# Patient Record
Sex: FEMALE | Race: WHITE | HISPANIC OR LATINO | Employment: UNEMPLOYED | URBAN - METROPOLITAN AREA
[De-identification: names, ages, dates, MRNs, and addresses within clinical notes are randomized per-mention and may not be internally consistent; named-entity substitution may affect disease eponyms.]

---
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
## 2025-01-02 NOTE — PROGRESS NOTES
"    [facility]    Dialectical Behavior Therapy Program    DBT Individual Psychotherapy Progress Note    Date: Dec 20, 2024    Patient Name: Lauren Montenegro     Preferred Name: \"Jacqueline\"    Patient Pronouns: She/Her, They/Them    Patient MRN: [id number]    Provider: Daniel Landauer, PhD, LP    Procedure: Individual DBT session    Appointment Duration: 3:00 to 3:50 PM (50 minutes)    People Present: Jacqueline and Dr. Landauer    Diagnosis:   Encounter Diagnoses   Name Primary?    Schizoaffective disorder, depressive type (H) Yes    ADHD (attention deficit hyperactivity disorder), combined type     Generalized anxiety disorder     Tobacco use disorder     Cannabis use disorder      Treatment Plan                                                                                              Problem 1: Jacqueline has significant difficulty regulating strong emotions and impulses effectively. This difficulty has contributed to history of suicidal ideation, multiple suicide attempts, self-harm behavior, risk-taking behaviors (including substance use), angry outbursts, and interpersonal relationship challenges.     Goal Jacqueline will demonstrate at least a 75% decrease in frequency and intensity of SIB and SI and will learn and utilize effectively alternative emotion regulation, distress tolerance, and impulse control strategies 75% of the time when needed prior to discharge..   Intervention Jacqueline will participate in full-model DBT including individual and family therapy and skills group in order to develop appropriate and effective emotion regulation, distress tolerance and impulse control skills.   Progress: Some progress. Jacqueline has demonstrated some overall improvement in emotion regulation and distress tolerance skills. There has been a significant decrease in frequency and intensity of suicidal ideation. Though she continues to experience some urges to self-harm, it has been several " "months since last engagement in SIB.  She still struggles with impulse control when emotions are intense, especially related to substance use and making risky/unsafe choices.  Target Date: 2/1/2024     Problem 2: Jacqueline reports experiencing worsening symptoms of depression and anxiety. These symptoms have contributed to and are exacerbated by interpersonal problems, academic problems, low self-esteem, and use of ineffective coping skills.     Goal \"I want to be happy, not looking at all negatives.\"     Jacqueline will demonstrate an increase in behavioral activation and effective coping by engaging in family and/or peer activities at least 2x per week, engaging in at least one pleasant or success activity per day, and use of coping skills in stressful situations at least 75% of the time..   Intervention Jacqueline will participate in full-model DBT including individual and family therapy and skills group in order to increase behavioral activation, develop stress management skills, improve mood, improve self-esteem, and manage anxiety.   Progress: Some progress. Jacqueline' mood has fluctuated since starting therapy. More recently she has indicated feeling more depressed and stressed and having increases in negative self-talk and decreases in self-esteem. She often worries that she is a burden on her family because of her mental health and substance use issues and feeling like she is always messing things up. She can experience dawood and has demonstrated overall improvement in motivation. She continues to struggle with sticking to plans to  improve healthy habits.  Target Date: 2/1/2024     Problem 3: Jacqueline has an extensive history of substance use and abuse, which has contributed to significant  interpersonal stress, academic issues, and depression and anxiety symptoms. She has had difficulty with maintaining sobriety despite these consequences. She has indicated little motivation to stay sober from Marijuana     Goal \"I want skills " "not to relapse.\"  Jacqueline will refrain from alcohol and drug use. She will learn and utilize alternative coping skills to substance use and will develop efficacy with refusing substances in peer situations, such that she is able to maintain 100% sobriety from alcohol, marijuana, and other drugs by the end of DBT programming   Intervention Jacqueline will participate in full-model DBT including individual and family therapy and skills group in order to develop effective distress tolerance and impulse control skills, so that she can resist urges to use substances, increase ability to make Wise Mind decisions about substance use, and increase awareness of factors that contribute to urges to use substances.   Progress: Intermittent progress. Jacqueline's dedication sobriety has waned over the last several months and she no longer wants to stay sober from marijuana. She continues to deny desire to use any other substances and states that she plans to stay clean from other drugs. She believes that she is able to regulate her marijuana use and not be dependent on it, though it is not clear if she is actually capable of only using marijuana recreationally. She is currently pursuing medical marijuana; she believes that this will be a safer way for her to use.  Target Date: 2/1/2024        Treatment Plan Completed: 10/06/2023  Treatment Plan Updated/Reviewed: 11/22/204  Treatment Plan Review Due: 2/01/2024  Session Content                                                                                               Subjective:  Jacqueline reported that her mood has been okay over the last week, though she expressed feeling stressed about school and her relationship with her boyfriend. She continued to report worry that her boyfriend is distancing himself from her and is not as interested in her as he used to be. She acknowledged that her anxiety has led her to be more avoidant as well; she expressed ambivalence about going to see him later " today. She also expressed anxiety about transitioning from this Clinician.      Objective/Intervention:   Clinician utilized a DBT approach during the session. Clinician and [place] reviewed the events since the last session. Clinician and [place] assessed for safety issues. Clinician and Jacqueline continued to address and process her concerns about her relationship with her boyfriend. Clinician and Jacqueline explored the effect her anxiety and negative self-talk is having on her engagement in the relationship and discussed the strategies she can use to counter her anxious thoughts and urges. Clinician and Jacqueline further processed her thoughts and feelings of transitioning from this Clinician to her other therapist.    Assessment:   Jacqueline initially presented as alert, engaged, and somewhat anxious today. She participated actively during the session. We discussed easing the transition to the other therapist by systematically reducing frequency of our sessions until she is settled with the new therapist.      Primary Targets Addressed in This Session:  Life-Threatening Behavior: N/A  Therapy Interfering Behavior: Substance use, sleepiness  Quality of Life: Interpersonal relationships, family issues, Healthy habits, self-esteem    DBT Skills reviewed or taught in Session:    ABC PLEASE skills, ACCEPTS/Self-soothe, Opposite Action, Pros and Cons, checking the facts, STOP, DEAR MAN    Patient Used Phone Coaching Since Last Session: No     Chain Analysis/Solution Analysis? No    Behavioral Assignments:  1) Utilize learned DBT Skills  2) Engage in daily exercise, eat 3 meals a day, practice good sleep hygiene.     Plan: Patient will continue in individual DBT until she is able to transition to another therapist for longer-term care.    Mental Status                                                                                                Behavioral Observations/Mental Status: Patient arrived on time to session. Patient was  adequately groomed. Eye contact was adequate. Mood today was anxious. Observed affect was full range. Speech was regular rate and rhythm. Thought process was Logical and Linear. Patient was actively engaged in session.  Risk Assessment:     Jacqueline has a long history dating back to early adolescence of suicidal ideation, self-harm behavior, and multiple suicide attempts. Most recent suicide attempt occurred in June 2023 while she was in residential substance use treatment. This incident was triggered by a combination of being bullied by other residents and frustration with not being able to go home. Previous suicide attempts have been of an impulsive nature and are usually triggered by a specific event. She has tried hanging herself 3 times and drowning herself 1 time. She last engaged in self-injury about a month prior to this assessment. At the time of the assessment she is denying experiencing active suicidal thoughts and denying significant urges to self-injure. She denied  experiencing passive suicidal ideation in recent weeks and denied any in the last week.     At the time of the assessment, Jacqueline identifies several reasons for living and demonstrates future-oriented thinking. She indicated that she is confident that she will not attempt to kill herself again, especially if she is able to stay in outpatient care. She identifies family and one friend as sources of social support who she can reach out to if she needs help or a distraction. She is able to identify other distractions and coping skills she can use if ideation gets more intense.     Based on risk and protective factors, patient is assessed to be at a Low Acute Risk (i.e., no current suicidal intent, specific plan, preparatory behaviors, and high confidence in patient's ability to maintain safety). She is likely at intermediate chronic risk for suicidal behavior given her challenges with emotion regulation and impulse control.    Daniel Landauer, PhD,  LP

## 2025-01-02 NOTE — PROGRESS NOTES
"    [facility]    Dialectical Behavior Therapy Program    DBT Individual Psychotherapy Progress Note    Date: Dec 13, 2024    Patient Name: Lauren Montenegro     Preferred Name: \"Jacqueline\"    Patient Pronouns: She/Her, They/Them    Patient MRN: [id number]    Provider: Daniel Landauer, PhD, LP    Procedure: Individual DBT session    Appointment Duration: 3:10 to 4:00 PM (50 minutes)    People Present: Jacqueline and Dr. Landauer    Diagnosis:   Encounter Diagnoses   Name Primary?    Schizoaffective disorder, depressive type (H) Yes    ADHD (attention deficit hyperactivity disorder), combined type     Generalized anxiety disorder     Tobacco use disorder     Cannabis use disorder            Treatment Plan                                                                                              Problem 1: Jacqueline has significant difficulty regulating strong emotions and impulses effectively. This difficulty has contributed to history of suicidal ideation, multiple suicide attempts, self-harm behavior, risk-taking behaviors (including substance use), angry outbursts, and interpersonal relationship challenges.     Goal Jacqueline will demonstrate at least a 75% decrease in frequency and intensity of SIB and SI and will learn and utilize effectively alternative emotion regulation, distress tolerance, and impulse control strategies 75% of the time when needed prior to discharge..   Intervention Jacqueline will participate in full-model DBT including individual and family therapy and skills group in order to develop appropriate and effective emotion regulation, distress tolerance and impulse control skills.   Progress: Some progress. Jacqueline has demonstrated some overall improvement in emotion regulation and distress tolerance skills. There has been a significant decrease in frequency and intensity of suicidal ideation. Though she continues to experience some urges to self-harm, it has been " "several months since last engagement in SIB.  She still struggles with impulse control when emotions are intense, especially related to substance use and making risky/unsafe choices.  Target Date: 2/1/2024     Problem 2: Jacqueline reports experiencing worsening symptoms of depression and anxiety. These symptoms have contributed to and are exacerbated by interpersonal problems, academic problems, low self-esteem, and use of ineffective coping skills.     Goal \"I want to be happy, not looking at all negatives.\"     Jacqueline will demonstrate an increase in behavioral activation and effective coping by engaging in family and/or peer activities at least 2x per week, engaging in at least one pleasant or success activity per day, and use of coping skills in stressful situations at least 75% of the time..   Intervention Jacqueline will participate in full-model DBT including individual and family therapy and skills group in order to increase behavioral activation, develop stress management skills, improve mood, improve self-esteem, and manage anxiety.   Progress: Some progress. Jacqueline' mood has fluctuated since starting therapy. More recently she has indicated feeling more depressed and stressed and having increases in negative self-talk and decreases in self-esteem. She often worries that she is a burden on her family because of her mental health and substance use issues and feeling like she is always messing things up. She can experience dawood and has demonstrated overall improvement in motivation. She continues to struggle with sticking to plans to  improve healthy habits.  Target Date: 2/1/2024     Problem 3: Jacqueline has an extensive history of substance use and abuse, which has contributed to significant  interpersonal stress, academic issues, and depression and anxiety symptoms. She has had difficulty with maintaining sobriety despite these consequences. She has indicated little motivation to stay sober from Marijuana     Goal \"I want " "skills not to relapse.\"  Jacqueline will refrain from alcohol and drug use. She will learn and utilize alternative coping skills to substance use and will develop efficacy with refusing substances in peer situations, such that she is able to maintain 100% sobriety from alcohol, marijuana, and other drugs by the end of DBT programming   Intervention Jacqueline will participate in full-model DBT including individual and family therapy and skills group in order to develop effective distress tolerance and impulse control skills, so that she can resist urges to use substances, increase ability to make Wise Mind decisions about substance use, and increase awareness of factors that contribute to urges to use substances.   Progress: Intermittent progress. Jacqueline's dedication sobriety has waned over the last several months and she no longer wants to stay sober from marijuana. She continues to deny desire to use any other substances and states that she plans to stay clean from other drugs. She believes that she is able to regulate her marijuana use and not be dependent on it, though it is not clear if she is actually capable of only using marijuana recreationally. She is currently pursuing medical marijuana; she believes that this will be a safer way for her to use.  Target Date: 2/1/2024        Treatment Plan Completed: 10/06/2023  Treatment Plan Updated/Reviewed: 11/22/204  Treatment Plan Review Due: 2/01/2024  Session Content                                                                                               Subjective:  Jacqueline reported that her mood has been up and down over the past weeks. She endorsed still ruminating a lot about the status of her relationship with her boyfriend and it is still significantly affecting her overall mood. She reported that after talking with her family it was decided that she would transition to the new therapist because the therapist's office is much closer to home and it will be easier to " "get to the appointments. She noted, however, that she thinks it will be difficult to transition to the new therapist from this Clinician even though she does like the new therapist.     Objective/Intervention:   Clinician utilized a DBT approach during the session. Clinician and Jacqueline reviewed the events since the last session. Clinician and Jacqueline assessed for safety issues. Clinician and Jacqueline continued to address and process her concerns about her relationship with her boyfriend. Clinician and [place] reviewed successes and challenges she has had with \"checking the facts\" with regard to her relationship concerns. Clinician and [place] then further explored her ongoing challenges with self-esteem and self-confidence. Clinician and [place] then discussed the decision to transition to her new therapist and reviewed options for making that transition successful.    Assessment:   Jacqueline initially presented as very sleepy - she was sleeping in the waiting room and it was difficult to rouse her. It took awhile for her to be more alert during the session (she admitted that she had stayed up very late playing video games the night before). She endorsed challenges with checking the facts as her emotions tends to be overpowering. She will meet with this Clinician next week to further discuss the transition plan to her new therapist.       Primary Targets Addressed in This Session:  Life-Threatening Behavior: N/A  Therapy Interfering Behavior: Substance use, sleepiness  Quality of Life: Interpersonal relationships, family issues, Healthy habits, self-esteem    DBT Skills reviewed or taught in Session:    ABC PLEASE skills, ACCEPTS/Self-soothe, Opposite Action, Pros and Cons, checking the facts, STOP, DEAR MAN    Patient Used Phone Coaching Since Last Session: No     Chain Analysis/Solution Analysis? No    Behavioral Assignments:  1) Utilize learned DBT Skills  2) Engage in daily exercise, eat 3 meals a day, practice good sleep " hygiene.     Plan: Patient will continue in individual DBT until she is able to transition to another therapist for longer-term care.    Mental Status                                                                                                Behavioral Observations/Mental Status: Patient arrived on time to session. Patient was adequately groomed. Eye contact was adequate. Mood today was anxious. Observed affect was full range. Speech was regular rate and rhythm. Thought process was Logical and Linear. Patient was actively engaged in session.  Risk Assessment:     Jacqueline has a long history dating back to early adolescence of suicidal ideation, self-harm behavior, and multiple suicide attempts. Most recent suicide attempt occurred in June 2023 while she was in residential substance use treatment. This incident was triggered by a combination of being bullied by other residents and frustration with not being able to go home. Previous suicide attempts have been of an impulsive nature and are usually triggered by a specific event. She has tried hanging herself 3 times and drowning herself 1 time. She last engaged in self-injury about a month prior to this assessment. At the time of the assessment she is denying experiencing active suicidal thoughts and denying significant urges to self-injure. She denied  experiencing passive suicidal ideation in recent weeks and denied any in the last week.     At the time of the assessment, Jacqueline identifies several reasons for living and demonstrates future-oriented thinking. She indicated that she is confident that she will not attempt to kill herself again, especially if she is able to stay in outpatient care. She identifies family and one friend as sources of social support who she can reach out to if she needs help or a distraction. She is able to identify other distractions and coping skills she can use if ideation gets more intense.     Based on risk and protective factors,  patient is assessed to be at a Low Acute Risk (i.e., no current suicidal intent, specific plan, preparatory behaviors, and high confidence in patient's ability to maintain safety). She is likely at intermediate chronic risk for suicidal behavior given her challenges with emotion regulation and impulse control.    Daniel Landauer, PhD, LP

## 2025-01-06 ENCOUNTER — TELEPHONE (OUTPATIENT)
Dept: OBGYN | Facility: CLINIC | Age: 20
End: 2025-01-06
Payer: COMMERCIAL

## 2025-01-06 NOTE — TELEPHONE ENCOUNTER
[facility] Call Center    Phone Message    May a detailed message be left on voicemail: yes     Reason for Call: Symptoms or Concerns     If patient has red-flag symptoms, warm transfer to triage line    Current symptom or concern: Pt reporting vaginal bleeding from nexplanon.     Symptoms have been present for:  1 day(s)    Has patient previously been seen for this? No      Are there any new or worsening symptoms? No    Action Taken: Message routed to:  Other: [facility]    Travel Screening: Not Applicable

## 2025-01-09 ENCOUNTER — OFFICE VISIT (OUTPATIENT)
Dept: PSYCHIATRY | Facility: CLINIC | Age: 20
End: 2025-01-09
Attending: STUDENT IN AN ORGANIZED HEALTH CARE EDUCATION/TRAINING PROGRAM
Payer: COMMERCIAL

## 2025-01-09 VITALS
HEART RATE: 111 BPM | DIASTOLIC BLOOD PRESSURE: 83 MMHG | WEIGHT: 168.4 LBS | SYSTOLIC BLOOD PRESSURE: 118 MMHG | BODY MASS INDEX: 31.82 KG/M2

## 2025-01-09 DIAGNOSIS — F90.2 ADHD (ATTENTION DEFICIT HYPERACTIVITY DISORDER), COMBINED TYPE: ICD-10-CM

## 2025-01-09 DIAGNOSIS — T88.7XXA MEDICATION SIDE EFFECTS: ICD-10-CM

## 2025-01-09 DIAGNOSIS — F12.90 CANNABIS USE DISORDER: ICD-10-CM

## 2025-01-09 DIAGNOSIS — F25.1 SCHIZOAFFECTIVE DISORDER, DEPRESSIVE TYPE (H): ICD-10-CM

## 2025-01-09 RX ORDER — ARIPIPRAZOLE 10 MG/1
10 TABLET ORAL AT BEDTIME
Qty: 30 TABLET | Refills: 2 | Status: SHIPPED | OUTPATIENT
Start: 2025-01-09

## 2025-01-09 RX ORDER — ATOMOXETINE 40 MG/1
40 CAPSULE ORAL DAILY
Qty: 30 CAPSULE | Refills: 2 | Status: SHIPPED | OUTPATIENT
Start: 2025-01-09

## 2025-01-09 RX ORDER — METFORMIN HYDROCHLORIDE 500 MG/1
TABLET, EXTENDED RELEASE ORAL
Qty: 90 TABLET | Refills: 2 | Status: SHIPPED | OUTPATIENT
Start: 2025-01-09

## 2025-01-09 RX ORDER — ESCITALOPRAM OXALATE 20 MG/1
20 TABLET ORAL DAILY
Qty: 30 TABLET | Refills: 2 | Status: SHIPPED | OUTPATIENT
Start: 2025-01-09

## 2025-01-09 ASSESSMENT — ANXIETY QUESTIONNAIRES
7. FEELING AFRAID AS IF SOMETHING AWFUL MIGHT HAPPEN: MORE THAN HALF THE DAYS
8. IF YOU CHECKED OFF ANY PROBLEMS, HOW DIFFICULT HAVE THESE MADE IT FOR YOU TO DO YOUR WORK, TAKE CARE OF THINGS AT HOME, OR GET ALONG WITH OTHER PEOPLE?: VERY DIFFICULT
GAD7 TOTAL SCORE: 13
IF YOU CHECKED OFF ANY PROBLEMS ON THIS QUESTIONNAIRE, HOW DIFFICULT HAVE THESE PROBLEMS MADE IT FOR YOU TO DO YOUR WORK, TAKE CARE OF THINGS AT HOME, OR GET ALONG WITH OTHER PEOPLE: VERY DIFFICULT
6. BECOMING EASILY ANNOYED OR IRRITABLE: SEVERAL DAYS
4. TROUBLE RELAXING: SEVERAL DAYS
2. NOT BEING ABLE TO STOP OR CONTROL WORRYING: MORE THAN HALF THE DAYS
3. WORRYING TOO MUCH ABOUT DIFFERENT THINGS: MORE THAN HALF THE DAYS
GAD7 TOTAL SCORE: 13
5. BEING SO RESTLESS THAT IT IS HARD TO SIT STILL: NEARLY EVERY DAY
1. FEELING NERVOUS, ANXIOUS, OR ON EDGE: MORE THAN HALF THE DAYS

## 2025-01-09 ASSESSMENT — PAIN SCALES - GENERAL: PAINLEVEL_OUTOF10: NO PAIN (0)

## 2025-01-09 NOTE — PATIENT INSTRUCTIONS
Treatment plan:    Medications- only change is to start taking your melatonin earlier at 7pm   - Continue atomoxetine 40 mg PO qDay  - Continue escitalopram 20 mg po qDay  - Continue aripiprazole 10 mg PO daily at bedtime   - Continue metformin  mg qAM (~7:30 am) and 1000 mg (2 tabs) with dinner (8 pm) for a total daily dose of 1500 mg)  -continue NAC supplement up to 1200mg BID    Continue individual therapy    Start using light box (10,000 lux) for 30 minutes to an hour when you wake up     **For crisis resources, please see the information at the end of this document**   Patient Education    Thank you for coming to the [facility] [place] [facility].     Lab Testing:  If you had lab testing today and your results are reassuring or normal they will be mailed to you or sent through [place] within 7 days. If the lab tests need quick action we will call you with the results. The phone number we will call with results is # [phone]. If this is not the best number please call our clinic and change the number.     Medication Refills:  If you need any refills please call your pharmacy and they will contact us. Our fax number for refills is [phone].   Three business days of notice are needed for general medication refill requests.   Five business days of notice are needed for controlled substance refill requests.   If you need to change to a different pharmacy, please contact the new pharmacy directly. The new pharmacy will help you get your medications transferred.     Contact Us:  Please call [phone] during business hours (8-5:00 M-F).   If you have medication related questions after clinic hours, or on the weekend, please call [phone].     Financial Assistance [phone]   Medical Records [phone]       MENTAL HEALTH CRISIS RESOURCES:  For a emergency help, please call 911 or go to the nearest Emergency Department.     Emergency Walk-In Options:   [facility] @ [facility] ([place]): [phone] - Specialized mental health emergency area designed to be calming  [facility] ([place]): [phone]  [facility] ([place]): [phone]  [facility] ([place]): [phone]    County Crisis Information:   [place]: [phone]  [place]: [phone]  [place] ([place]) - Adult: [phone]     Child: [phone]  [place] - Adult: [phone]     Child: [phone]  [place]: [phone]  List of all [facility] resources:   https://[place].[email or web address]/dhs/people-we-serve/adults/health-care/mental-health/resources/crisis-contacts.jsp    National Crisis Information:   Crisis Text Line: Text  [place]  to 019623  Suicide & Crisis Lifeline: 988  National Suicide Prevention Lifeline: 9-469-942-QPRX (1-792.163.5500)       For online chat options, visit https://suicidepreventionlifeline.org/chat/  Poison Control Center: 1-967.484.6189  Trans Lifeline: 1-148.614.2067 - Hotline for transgender people of all ages  [employer]: 1-077-703-0841 - Hotline for LGBT youth     For Non-Emergency Support:   Fast Tracker: Mental Health & Substance Use Disorder Resources -   https://www.[employer].org/

## 2025-01-09 NOTE — PROGRESS NOTES
" owns   [facility]  Psychosis Treatment Team  MEDICAL PROGRESS NOTE       CARE TEAM:    PCP: Caroline Moreno  Therapist: Prisca Stallings - [facility]  (Aly Bell)- Leela Phillips is a 19 year old who uses the pronouns she, her, hers, they, them, theirs.                 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    Today, Jacqueline reports somewhat improved adherence with medications and has recently had a PCA start who will help with this and other daily tasks.  Reports continued daily cannabis use, has no interest in abstaining at this time, also denies any symptoms of psychosis.  Reports intermittent thoughts of self-harm, though has not acted on these, continues DBT which she finds helpful and denies any other safety concerns, including SI.  During the visit, she was notably sleepy, discussed with dad and her who agreed that her sleep cycle is not optimized and therefore she is very tired in the afternoons, which her therapist has also noticed.  Encouraged use of sad lamp in the morning and taking melatonin several hours before intended bedtime.  Dad reports she did have a sleep " study a few years ago which did not show acute concerns or need for interventions, can consider future sleep study for sleep apnea as needed and if lifestyle/sleep hygiene interventions are not adequate.      Psychotropic Drug Interactions:    ADDITIVE SEROTONERGIC: Abilify, escitalopram  ADDITIVE QTc: Escitalopram, atomoxetine   Management: routine monitoring    [place]NPMP was not checked today: not using controlled substances    Risk Statements:   Treatment Risk: Risks, benefits, alternatives and potential adverse effects have been discussed and are understood.   Safety Risk: Jacqueline did not appear to be an imminent safety risk to self or others.  Future Considerations:  -Continue coordinating care with parents as needed   -Optimizing ADHD medication as needed (atomoxetine)  -Avoid stimulants due to potential for worsening psychosis   -Monitoring Abilify effectiveness as well as lont-term use/metabolic side effects.  -Consider referral to weight management clinic if needed   -Residential level of care was recommended by Dr. Alejo, we anticipate continuing working with Jacqueline in the interim until she is able to go to residential treatment.   -Once a period of sustained sobriety is achieved, consider obtaining neuropsychological testing to better understand her functioning as well as continue to support her with appropriate resources   -Continue offering/exploring resources for substance use/relapse prevention       PLAN    1) Medications: no changes to regimen  - Continue atomoxetine 40 mg PO qDay  - Continue escitalopram 20 mg po qDay  - Continue aripiprazole 10 mg PO daily at bedtime (had been taking in the AM)   - Continue metformin  mg qAM (~7:30 am) and 1000 mg (2 tabs) with dinner (8 pm) for a total daily dose of 1500 mg)  -continue NAC supplement up to 1200mg BID     Other:   -light box to promote wakefulness/support mood  -Continue melatonin 1-3 mg PO at bedtime PRN.      2) Psychotherapy: Individual therapy  with Prisca Stallings, individual DBT with Dr. Melara weekly    3) Next due:  Labs- Yearly AP labs-updated CBC, CMP, A1C and fasting lipids on 07/18/24  EKG- Routine monitoring is not indicated for current psychotropic medication regimen   Rating scales- AIMS:last score = 0. Next due December 2024. PHQ9, GAD7     4) Referrals: None    5) Follow-up: Return to clinic in 4 weeks                  Interval History   - Updates: went to cabin with family holiday, this was relaxing and she enjoyed it.  Continues to have some relationship stress with her boyfriend. PCA services set up recently- come out one hour each day to remind her to take medications    -Mood: had some low mood about relationship stuff, though this is temporary  -SI: denies   -Anxiety: not good, been nervous recently about relationship, no panic attacks  -Sleep: tired in afternoon, goes to bed at 11pm, wakes up at 6am, thinks in general she gets enough sleep   Dad thinks sleep hygiene is not ideal, hard to get her up in the morning.   -Appetite/eating: wants to eat less   -Psychosis: Denies any symptoms  -Substances: Regular cannabis use  -Therapy: Continues with individual therapy and DBT  -Medications: Thinks adherence has slightly improved, although still forgets sometimes on weekends    Current Social History:  Financial/occupational: in school   Living situation: lives at home with parents  Social/spiritual support: family, friends, medical team      Pertinent Substance Use:  [Last updated 09/05/24]  Alcohol: Yes: intermittent   Cannabis: Yes: daily   Tobacco: Yes: once every other day vaping     Medical Review of Systems / Med sfx:     Contraception: Yes: getting nexplanon                 Summary Points of Current Care  09/2024: mood continues to be stable, dicussed cannabis use with patient and parents and contraindication with hx of psychosis and mood   10/24: Due to daytime sedation, encouraged her to take shorter naps and regulate sleep cycle,  also encouraged to abstain from cannabis  11/24: No medication changes, did request refill of OCP which we will bridge until establishes with new PCP  12/24: No medication changes, discussed strategies for improved adherence, will be getting Nexplanon  1/2025: No medication changes, recommended using [facility] and melatonin a few hours before intended bedtime to help with sleep hygiene                Physical Exam  (Vitals Only)    LMP 11/08/2024 (Approximate)   Pulse Readings from Last 3 Encounters:   12/05/24 81   11/07/24 94   10/03/24 105     Wt Readings from Last 3 Encounters:   12/10/24 74.8 kg (165 lb) (90%, Z= 1.26)*   12/04/24 74.8 kg (165 lb) (90%, Z= 1.26)*   11/07/24 75.4 kg (166 lb 3.2 oz) (90%, Z= 1.30)*     * Growth percentiles are based on [facility] (Girls, 2-20 Years) data.     BP Readings from Last 3 Encounters:   12/10/24 110/70   12/05/24 110/74   12/04/24 102/64                      Mental Status Exam    Alertness: oriented, drowsy, and sleepy  Appearance: casually groomed  Behavior/Demeanor: cooperative and calm, with good  eye contact   Speech: normal and regular rate and rhythm  Language: intact and no problems  Psychomotor: normal or unremarkable  Mood: description consistent with euthymia  Affect: restricted; congruent to: mood- yes, content- yes  Thought Process/Associations: unremarkable  Thought Content:  Reports none;  Denies suicidal ideation  Perception:  Reports none;  Denies hallucinations  Insight: good  Judgment: good  Cognition: does  appear grossly intact; formal cognitive testing was not done  Gait and Station: unremarkable                  Past Psychotropic Medication Trials          Medication Max Dose (mg) Dates / Duration Helpful? DC Reason / Adverse Effects?   sertraline           escitalopram 20         buspar           bupropion                       prazosin           hydroxyzine       Not needed/taken   adderall           Atomoxetine  25                     Abilify  10                         Past Medical History     Patient Active Problem List   Diagnosis    Suicidal ideation    Schizoaffective disorder, bipolar type (H)    ADHD (attention deficit hyperactivity disorder), combined type    Anxiety    Cannabis use disorder    Suicide ideation                     Medications     Current Outpatient Medications   Medication Sig Dispense Refill    acetylcysteine (N-ACETYL CYSTEINE) 600 MG CAPS capsule Take 2 capsules (1,200 mg) by mouth 2 times daily. (Patient not taking: Reported on 12/10/2024) 120 capsule 2    ARIPiprazole (ABILIFY) 10 MG tablet Take 1 tablet (10 mg) by mouth at bedtime. 30 tablet 2    atomoxetine (STRATTERA) 40 MG capsule Take 1 capsule (40 mg) by mouth daily. 30 capsule 2    escitalopram (LEXAPRO) 20 MG tablet Take 1 tablet (20 mg) by mouth daily. 30 tablet 2    etonogestrel (NEXPLANON) 68 MG IMPL 1 each (68 mg) by Subdermal route once.      metFORMIN (GLUCOPHAGE XR) 500 MG 24 hr tablet Take 2 tablets (1,000 mg) by mouth daily AND 1 tablet (500 mg) daily (with dinner). For a total daily dose of 1500 mg. 90 tablet 2    naloxone (NARCAN) 4 MG/0.1ML nasal spray Spray 1 spray (4 mg) into one nostril alternating nostrils as needed for opioid reversal. every 2-3 minutes until assistance arrives 2 each 3    norgestimate-ethinyl estradiol (ORTHO-CYCLEN) 0.25-35 MG-MCG tablet Take 1 tablet by mouth daily. (Patient not taking: Reported on 12/4/2024) 30 tablet 1    triamcinolone (KENALOG) 0.025 % cream Apply topically daily as needed for irritation 80 g 0                     Data         7/18/2024     1:10 PM 8/30/2024     8:00 AM 9/17/2024     5:19 PM   PROMIS-10 Total Score w/o Sub Scores   PROMIS TOTAL - SUBSCORES 27 22    22 26         12/10/2024     3:10 PM 1/13/2025     2:27 PM 1/13/2025     2:28 PM   PHQ-9 SCORE   PHQ-9 Total Score MyChart   17 (Moderately severe depression)   PHQ-9 Total Score 13 17         Patient-reported         12/4/2024     3:22 PM 12/10/2024      3:10 PM 1/9/2025     1:44 PM   SHIVANI-7 SCORE   Total Score 13 (moderate anxiety)  13 (moderate anxiety)   Total Score 13  14 13        Patient-reported       Liver/Kidney Function, TSH Metabolic Blood counts   Recent Labs   Lab Test 07/18/24  1510 06/24/23  0717   AST 19 21   ALT 18 20   ALKPHOS 68 64   CR 0.75 0.57     Recent Labs   Lab Test 06/24/23  0717   TSH 1.44    Recent Labs   Lab Test 07/18/24  1510   CHOL 202*   TRIG 122*      HDL 71     Recent Labs   Lab Test 07/18/24  1510   A1C 5.0     Recent Labs   Lab Test 07/18/24  1510   GLC 81    Recent Labs   Lab Test 07/18/24  1510   WBC 9.1   HGB 12.5*   HCT 36.9   MCV 84              Level of Medical Decision Making:   - At least 1 chronic problem that is not stable  - Engaged in prescription drug management during visit (discussed any medication benefits, side effects, alternatives, etc.)       The longitudinal plan of care for the diagnosis(es)/condition(s) as documented were addressed during this visit. Due to the added complexity in care, I will continue to support Jacqueline in the subsequent management and with ongoing continuity of care.      PROVIDER: Rosa Govea MD    Patient not staffed in clinic.  Note will be reviewed and signed by Dr. Godoy

## 2025-01-09 NOTE — NURSING NOTE
Chief Complaint   Patient presents with    Recheck Medication     ADHD (attention deficit hyperactivity disorder), combined type      - José Miguel Khan, Visit Facilitator

## 2025-01-13 ENCOUNTER — VIRTUAL VISIT (OUTPATIENT)
Dept: PSYCHOLOGY | Facility: CLINIC | Age: 20
End: 2025-01-13
Payer: COMMERCIAL

## 2025-01-13 DIAGNOSIS — F90.2 ADHD (ATTENTION DEFICIT HYPERACTIVITY DISORDER), COMBINED TYPE: ICD-10-CM

## 2025-01-13 DIAGNOSIS — F41.1 GAD (GENERALIZED ANXIETY DISORDER): ICD-10-CM

## 2025-01-13 DIAGNOSIS — F25.1 SCHIZOAFFECTIVE DISORDER, DEPRESSIVE TYPE (H): Primary | ICD-10-CM

## 2025-01-13 DIAGNOSIS — F12.90 CANNABIS USE DISORDER: ICD-10-CM

## 2025-01-13 PROCEDURE — 90834 PSYTX W PT 45 MINUTES: CPT | Mod: 95

## 2025-01-13 ASSESSMENT — PATIENT HEALTH QUESTIONNAIRE - PHQ9
10. IF YOU CHECKED OFF ANY PROBLEMS, HOW DIFFICULT HAVE THESE PROBLEMS MADE IT FOR YOU TO DO YOUR WORK, TAKE CARE OF THINGS AT HOME, OR GET ALONG WITH OTHER PEOPLE: VERY DIFFICULT
SUM OF ALL RESPONSES TO PHQ QUESTIONS 1-9: 17
SUM OF ALL RESPONSES TO PHQ QUESTIONS 1-9: 17

## 2025-01-17 ENCOUNTER — OFFICE VISIT (OUTPATIENT)
Dept: PSYCHIATRY | Facility: CLINIC | Age: 20
End: 2025-01-17
Payer: COMMERCIAL

## 2025-01-17 DIAGNOSIS — F17.200 TOBACCO USE DISORDER: ICD-10-CM

## 2025-01-17 DIAGNOSIS — F12.90 CANNABIS USE DISORDER: ICD-10-CM

## 2025-01-17 DIAGNOSIS — F41.1 GENERALIZED ANXIETY DISORDER: ICD-10-CM

## 2025-01-17 DIAGNOSIS — F90.2 ADHD (ATTENTION DEFICIT HYPERACTIVITY DISORDER), COMBINED TYPE: ICD-10-CM

## 2025-01-17 DIAGNOSIS — F25.1 SCHIZOAFFECTIVE DISORDER, DEPRESSIVE TYPE (H): Primary | ICD-10-CM

## 2025-01-17 PROCEDURE — 90785 PSYTX COMPLEX INTERACTIVE: CPT | Performed by: PSYCHOLOGIST

## 2025-01-17 PROCEDURE — 90837 PSYTX W PT 60 MINUTES: CPT | Performed by: PSYCHOLOGIST

## 2025-01-17 NOTE — PROGRESS NOTES
[facility]                                     Progress Note    Patient Name: Lauren Montenegro  Date: 1/13/2025         Service Type: Individual      Session Start Time: 2:30 PM  Session End Time: 3:08 PM     Session Length: 38 Minutes    Session #: 5    Attendees: Client attended alone    Service Modality: Video Visit:      Provider verified identity through the following two step process.  Patient provided:  Patient is known previously to provider    Telemedicine Visit: The patient's condition can be safely assessed and treated via synchronous audio and visual telemedicine encounter.      Reason for Telemedicine Visit: Patient has requested telehealth visit    Originating Site (Patient Location): Patient's home    Distant Site (Provider Location): [facility]    Consent:  The patient/guardian has verbally consented to: the potential risks and benefits of telemedicine (video visit) versus in person care; bill my insurance or make self-payment for services provided; and responsibility for payment of non-covered services.     Patient would like the video invitation sent by:  My Chart    Mode of Communication:  Video Conference via Amwell    Distant Location (Provider):  Off-site    As the provider I attest to compliance with applicable laws and regulations related to telemedicine.    DATA  Extended Session (53+ minutes): No  Interactive Complexity: No  Crisis: No      Progress Since Last Session (Related to Symptoms / Goals / Homework):   Symptoms: Worsening anxiety, low mood, irritation SI and SIB    Homework: Partially completed      Episode of Care Goals: Minimal progress - PREPARATION (Decided to change - considering how); Intervened by negotiating a change plan and determining options / strategies for behavior change, identifying triggers, exploring social supports, and working towards setting a date to begin behavior  "change     Current / Ongoing Stressors and Concerns:    Desires to cut back on cannabis use and alcohol. She reports daily use of cannabis in the afternoons after class to help her \"calm down and relax before bed.\" She states she only drinks on the weekends with friends. She recently started dating someone and is feeling that they are  moving too fast yest notes she feels she will lose him if she says no to him.      Treatment Objective(s) Addressed in This Session:   use cognitive strategies identified in therapy to challenge anxious thoughts  Decrease frequency and intensity of feeling down, depressed, hopeless  Harm reduction     Intervention:  Therapist utilized reflected listening as patient gave brief reflection of the past few weeks. The patient  reports worsening worsening anxiety, low mood, irritation, SI and SIB. She processed her feelings and  agitation with school, her parents wishes and her partner. She reports she has been out of her medication for a \"few days\" now.  Therapist supported patient as she processed and validated patient. Therapist engaged in cognitive restructuring/ reframing, looked at cognitive distortions and challenged distorted thoughts. Therapist roleplayed with client ways to connecting with her parents and using \"I\" statements. Client reported safety concerns, rating them a 1 out of 5, 5 being the highest. She denied any plans and intent.     Assessments completed prior to visit:  The following assessments were completed by patient for this visit:  PHQ2:       9/13/2024    10:18 AM 8/2/2024     3:00 PM 5/11/2023     2:19 PM   PHQ-2 ( 1999 [employer])   Q1: Little interest or pleasure in doing things 0 0 1    Q2: Feeling down, depressed or hopeless 0 1 1    PHQ-2 Score 0 1 2    2   Q1: Little interest or pleasure in doing things   Several days   Q2: Feeling down, depressed or hopeless   Several days   PHQ-2 Score   2       Proxy-reported     PHQ9:       10/28/2024     1:54 PM 11/7/2024 "     3:25 PM 11/11/2024     2:11 PM 12/4/2024     3:21 PM 12/10/2024     3:10 PM 1/13/2025     2:27 PM 1/13/2025     2:28 PM   PHQ-9 SCORE   PHQ-9 Total Score MyChart 11 (Moderate depression) 18 (Moderately severe depression) 13 (Moderate depression) 14 (Moderate depression)   17 (Moderately severe depression)   PHQ-9 Total Score  18  13  14  13 17         Patient-reported     GAD2:       7/11/2024     2:37 PM 7/18/2024     1:09 PM 7/31/2024     4:51 PM 8/28/2024     8:58 AM 10/28/2024     1:54 PM 11/7/2024     3:26 PM 1/9/2025     1:44 PM   SHIVANI-2   Feeling nervous, anxious, or on edge 2 2 2  2  3 2 2   Not being able to stop or control worrying 2 2 2  2   2 2   SHIVANI-2 Total Score 4 4 4    4 4  4  4        Patient-reported    Proxy-reported     GAD7:       7/31/2024     4:51 PM 8/28/2024     8:58 AM 9/17/2024     5:19 PM 11/7/2024     3:26 PM 12/4/2024     3:22 PM 12/10/2024     3:10 PM 1/9/2025     1:44 PM   SHIVANI-7 SCORE   Total Score 13 (moderate anxiety) 11 (moderate anxiety)  13 (moderate anxiety) 13 (moderate anxiety)  13 (moderate anxiety)   Total Score 13    13 11 6 13  13  14 13        Patient-reported     CAGE-AID:       6/18/2024     8:04 AM   CAGE-AID Total Score   Total Score 1   Total Score MyChart 1 (A total score of 2 or greater is considered clinically significant)     PROMIS 10-Global Health (all questions and answers displayed):       4/3/2024     5:10 PM 6/18/2024     8:04 AM 7/11/2024     2:38 PM 7/18/2024     1:10 PM 8/30/2024     8:00 AM 9/17/2024     5:19 PM 1/9/2025     1:45 PM   PROMIS 10   In general, would you say your health is:  Good   Good  Fair   In general, would you say your quality of life is:  Very good   Very good  Good   In general, how would you rate your physical health?  Good   Fair  Fair   In general, how would you rate your mental health, including your mood and your ability to think?  Good   Fair  Good   In general, how would you rate your satisfaction with your social  activities and relationships?  Very good   Good  Good   In general, please rate how well you carry out your usual social activities and roles  Good   Fair  Good   To what extent are you able to carry out your everyday physical activities such as walking, climbing stairs, carrying groceries, or moving a chair?  Completely   Mostly  Completely   In the past 7 days, how often have you been bothered by emotional problems such as feeling anxious, depressed, or irritable?  Sometimes   Often  Often   In the past 7 days, how would you rate your fatigue on average?  Moderate   Moderate  Moderate   In the past 7 days, how would you rate your pain on average, where 0 means no pain, and 10 means worst imaginable pain?  3   7  0   In general, would you say your health is:  3    3  3    In general, would you say your quality of life is:  4    4  3    In general, how would you rate your physical health?  3    2  3    In general, how would you rate your mental health, including your mood and your ability to think?  3    2  2    In general, how would you rate your satisfaction with your social activities and relationships?  4    3  3    In general, please rate how well you carry out your usual social activities and roles. (This includes activities at home, at work and in your community, and responsibilities as a parent, child, spouse, employee, friend, etc.)  3    2  3    To what extent are you able to carry out your everyday physical activities such as walking, climbing stairs, carrying groceries, or moving a chair?  5    4  3    In the past 7 days, how often have you been bothered by emotional problems such as feeling anxious, depressed, or irritable?  3    4  3    In the past 7 days, how would you rate your fatigue on average?  3    3  2    In the past 7 days, how would you rate your pain on average, where 0 means no pain, and 10 means worst imaginable pain?  3    7  0    Global Mental Health Score      14   11    11 11    Global  Physical Health Score      15   11    11 15    PROMIS TOTAL - SUBSCORES      29   22    22 26        Information is confidential and restricted. Go to Review Flowsheets to unlock data.    Proxy-reported     [place] Suicide Severity Rating Scale (Lifetime/Recent)      8/2/2023    11:00 AM 8/21/2023     2:00 PM 8/22/2023     8:00 AM 11/15/2023     7:51 PM 11/15/2023    10:31 PM 11/16/2023     9:15 AM 6/18/2024     8:47 AM   Stanly Suicide Severity Rating (Lifetime/Recent)   Q1 Wished to be Dead (Past Month)    yes no no    Q2 Suicidal Thoughts (Past Month)    yes yes no    Q3 Suicidal Thought Method    yes yes     Q4 Suicidal Intent without Specific Plan    no no     Q5 Suicide Intent with Specific Plan    yes yes     Q6 Suicide Behavior (Lifetime)     yes     If yes to Q6, within past 3 months?     no yes    Level of Risk per Screen    high risk high risk     Q1 Wish to be Dead (Lifetime)       Y   Wish to be Dead Description (Lifetime)       in 8th grade,thoughts  and did something that she can't remember.   1. Wish to be Dead (Past 1 Month)       N   Q2 Non-Specific Active Suicidal Thoughts (Lifetime)       N   Most Severe Ideation Rating (Lifetime)       1   Most Severe Ideation Rating (Past 1 Month)      2 --   Frequency (Lifetime)       1   Frequency (Past 1 Month)      2 --   Duration (Lifetime)       1   Duration (Past 1 Month)      2 --   Controllability (Lifetime)       1   Controllability (Past 1 Month)      2 1   Deterrents (Lifetime)       0   Deterrents (Past 1 Month)      2 0   Reasons for Ideation (Lifetime)       0   Reasons for Ideation (Past 1 Month)      5 0   Actual Attempt (Lifetime)       N   Actual Attempt (Past 3 Months)      N    Has subject engaged in non-suicidal self-injurious behavior? (Lifetime)       N   Has subject engaged in non-suicidal self-injurious behavior? (Past 3 Months)      N    Interrupted Attempts (Lifetime)       N   Interrupted Attempts (Past 3 Months)      N     Aborted or Self-Interrupted Attempt (Lifetime)       N   Aborted or Self-Interrupted Attempt (Past 3 Months)      N    Preparatory Acts or Behavior (Lifetime)       N   Preparatory Acts or Behavior (Past 3 Months)      N    Most Lethal Attempt Date 6/21/2023 6/21/2023 6/21/2023       Actual Lethality/Medical Damage Code (Most Lethal Attempt) 0 0 0       Potential Lethality Code (Most Lethal Attempt) 1 1 1       Calculated C-SSRS Risk Score (Lifetime/Recent)      No Risk Indicated No Risk Indicated       ASSESSMENT: Current Emotional / Mental Status (status of significant symptoms):   Risk status (Self / Other harm or suicidal ideation)   Patient denies current fears or concerns for personal safety.   Patient reports the following current or recent suicidal ideation or behaviors: Thoughts of being better off dead, denies plan and intent.   Patient denies current or recent homicidal ideation or behaviors.   Patient reports current or recent self injurious behavior or ideation including thought of SIB, denies plan and intent .   Patient denies other safety concerns.   Patient reports there has been no change in risk factors since their last session.     Patient reports there has been no change in protective factors since their last session.     A safety and risk management plan has been developed including: Patient consented to co-developed safety plan on 6/18/2024.  Safety and risk management plan was reviewed.   Patient agreed to use safety plan should any safety concerns arise.  A copy was made available to the patient.  Patient denied needing to updated it.      Appearance:   Appropriate    Eye Contact:   Fair    Psychomotor Behavior: Normal    Attitude:   Cooperative    Orientation:   All   Speech    Rate / Production: Normal/ Responsive    Volume:  Normal    Mood:    Depressed  Irritable    Affect:    Subdued    Thought Content:  Clear  Rumination    Thought Form:  Coherent  Logical    Insight:    Good  and  "Fair      Medication Review:   No changes to current psychiatric medication(s)     Medication Compliance:   No Has been off her medication for a \"few\" days, reports her boyfriend will be bring over the medication she had left at his house.     Changes in Health Issues:   None reported     Chemical Use Review:   Substance Use: Chemical use reviewed, no active concerns identified      Tobacco Use: No current tobacco use.      Diagnosis:  1. Schizoaffective disorder, depressive type (H)    2. ADHD (attention deficit hyperactivity disorder), combined type    3. SHIVANI (generalized anxiety disorder)    4. Cannabis use disorder      Collateral Reports Completed:   Not Applicable    PLAN: (Patient Tasks / Therapist Tasks / Other):  Your options until the next visit:  Patient will keep her goal to use marijuana only before bed time VS using it all day along  Patient will do exercise inside the house to avoid interacting with drug dealers.   Patient will share her goal with family for support  Patient will keep her plan to use a daily planner to help with memory and organization  Patient's next visit is in 1 week.     USMAN Waters     ______________________________________________________________________    Individual Treatment Plan    Patient's Name: Lauren Montenegro  YOB: 2005    Date of Creation: 8/06/2024  Date Treatment Plan Last Reviewed/Revised: 1/13/2025    DSM5 Diagnoses: Attention-Deficit/Hyperactivity Disorder  314.01 (F90.2) Combined presentation, 295.70  (F25.1) Schizoaffective Disorder Depressive Type, or Substance-Related & Addictive Disorders 304.30 (F12.20) Cannabis Use Disorder Moderate  current use    Psychosocial / Contextual Factors:Per patient's father, \"Help with managing mental health challenges as noted in her chart. This also includes coaching on substance use, and support in the development of executive function skills. She has been working to overcome developmental issues " "since early childhood.\"     PROMIS (reviewed every 90 days): 27    Referral / Collaboration:  Referral to another professional/service is not indicated at this time..    Anticipated number of session for this episode of care: 9-12 sessions  Anticipation frequency of session: Biweekly  Anticipated Duration of each session: 38-52 minutes  Treatment plan will be reviewed in 90 days or when goals have been changed.     MeasurableTreatment Goal(s) related to diagnosis / functional impairment(s)  Goal 1: Patient will accept the marijuana use as a mood altering product that would affect her levels of functioning.     I will know I've met my goal when I have  cut use of 3 times of use daily and $ 75/ week spending to at least once a day and $ 75/month      Objective #A (Patient Action)    Patient will  smoke less and only at the bed time .  Status: Continued - Date(s): 1/13/2025    Intervention(s)  Therapist will teach emotional regulation skills. : new and healthy coping skills .    Objective #B  Patient will use daily planner at least 85 % of the time.  Status: Continued - Date(s): 1/13/2025    Intervention(s)  Therapist will teach tips for ADHD .    Goal 2: Patient will control or eliminate active psychotic symptoms, increase goal directed behaviors     I will know I've met my goal when I am  able to Id the negative symptoms and positive symptoms and replace them with healthy coping skills.      Objective #A (Patient Action)    Status: Continued - Date(s): 1/13/2025  Patient will  practice the reality check skills as needed .  Intervention(s)  Therapist will provide education around the symptoms of her condition using the Reality check option .    Patient has reviewed and agreed to the above plan.    USMAN Waters  January 13, 2025    "

## 2025-01-20 ENCOUNTER — VIRTUAL VISIT (OUTPATIENT)
Dept: PSYCHOLOGY | Facility: CLINIC | Age: 20
End: 2025-01-20
Payer: COMMERCIAL

## 2025-01-20 DIAGNOSIS — F12.90 CANNABIS USE DISORDER: ICD-10-CM

## 2025-01-20 DIAGNOSIS — F90.2 ADHD (ATTENTION DEFICIT HYPERACTIVITY DISORDER), COMBINED TYPE: ICD-10-CM

## 2025-01-20 DIAGNOSIS — F25.1 SCHIZOAFFECTIVE DISORDER, DEPRESSIVE TYPE (H): Primary | ICD-10-CM

## 2025-01-20 DIAGNOSIS — F41.1 GAD (GENERALIZED ANXIETY DISORDER): ICD-10-CM

## 2025-01-20 PROCEDURE — 90832 PSYTX W PT 30 MINUTES: CPT | Mod: 95

## 2025-01-20 NOTE — PROGRESS NOTES
[facility]                                     Progress Note    Patient Name: Lauren Montenegro  Date: 1/20/2025         Service Type: Individual      Session Start Time: 2:33 PM  Session End Time: 3:02 PM     Session Length: 29 Minutes    Session #: 6    Attendees: Client attended alone    Service Modality: Video Visit:      Provider verified identity through the following two step process.  Patient provided:  Patient is known previously to provider    Telemedicine Visit: The patient's condition can be safely assessed and treated via synchronous audio and visual telemedicine encounter.      Reason for Telemedicine Visit: Patient has requested telehealth visit    Originating Site (Patient Location): Patient's other Boyfriend's home    Distant Site (Provider Location): Provider Remote Setting- Home Office    Consent:  The patient/guardian has verbally consented to: the potential risks and benefits of telemedicine (video visit) versus in person care; bill my insurance or make self-payment for services provided; and responsibility for payment of non-covered services.     Patient would like the video invitation sent by:  My Chart    Mode of Communication:  Video Conference via [employer]    Distant Location (Provider):  On-site    As the provider I attest to compliance with applicable laws and regulations related to telemedicine.    DATA  Extended Session (53+ minutes): No  Interactive Complexity: No  Crisis: No      Progress Since Last Session (Related to Symptoms / Goals / Homework):   Symptoms: Improving slight reduction in anxiety, low mood, irritation with others and continued passive SI and SIB     Homework: Partially completed      Episode of Care Goals: Minimal progress - PREPARATION (Decided to change - considering how); Intervened by negotiating a change plan and determining options / strategies for behavior change, identifying triggers, exploring social supports, and working towards  "setting a date to begin behavior change     Current / Ongoing Stressors and Concerns:    Desires to cut back on cannabis use and alcohol. She reports daily use of cannabis in the afternoons after class to help her \"calm down and relax before bed.\" She states she only drinks on the weekends with friends. She recently started dating someone and is feeling that they are  moving too fast yest notes she feels she will lose him if she says no to him.   Due to being at her boyfriend's house she did not feel comfortable talking about certain topics. Client asked to end her session early again this week.     Treatment Objective(s) Addressed in This Session:   use cognitive strategies identified in therapy to challenge anxious thoughts  Decrease frequency and intensity of feeling down, depressed, hopeless  Harm reduction     Intervention:  Therapist utilized reflected listening as patient gave brief reflection of the past few weeks. The patient  reports a slight reduction in anxiety, low mood, irritation with others and continued passive SI and SIB. She processed her feelings of stress and irritation with increased meetings with her . She reflected on her hopes of starting an internship at [employer] which means less time spent at school. She reports she has restarted her medication ans thinks this has helped her feel better. Therapist supported patient as she processed and validated patient. Therapist engaged in cognitive restructuring/ reframing, looked at cognitive distortions and challenged distorted thoughts. Therapist reviewed opposite action to emotion when she is feeling irritable and sad. Client reported safety concerns, rating them a 1 out of 5, 5 being high. She denied any plans and intent.     Assessments completed prior to visit:  The following assessments were completed by patient for this visit:  PHQ2:       9/13/2024    10:18 AM 8/2/2024     3:00 PM 5/11/2023     2:19 PM   PHQ-2 ( 1999 " [employer])   Q1: Little interest or pleasure in doing things 0 0 1    Q2: Feeling down, depressed or hopeless 0 1 1    PHQ-2 Score 0 1 2    2   Q1: Little interest or pleasure in doing things   Several days   Q2: Feeling down, depressed or hopeless   Several days   PHQ-2 Score   2       Proxy-reported     PHQ9:       10/28/2024     1:54 PM 11/7/2024     3:25 PM 11/11/2024     2:11 PM 12/4/2024     3:21 PM 12/10/2024     3:10 PM 1/13/2025     2:27 PM 1/13/2025     2:28 PM   PHQ-9 SCORE   PHQ-9 Total Score MyChart 11 (Moderate depression) 18 (Moderately severe depression) 13 (Moderate depression) 14 (Moderate depression)   17 (Moderately severe depression)   PHQ-9 Total Score  18  13  14  13 17         Patient-reported     GAD2:       7/11/2024     2:37 PM 7/18/2024     1:09 PM 7/31/2024     4:51 PM 8/28/2024     8:58 AM 10/28/2024     1:54 PM 11/7/2024     3:26 PM 1/9/2025     1:44 PM   SHIVANI-2   Feeling nervous, anxious, or on edge 2 2 2  2  3 2 2   Not being able to stop or control worrying 2 2 2  2   2 2   SHIVANI-2 Total Score 4 4 4    4 4  4  4        Patient-reported    Proxy-reported     GAD7:       7/31/2024     4:51 PM 8/28/2024     8:58 AM 9/17/2024     5:19 PM 11/7/2024     3:26 PM 12/4/2024     3:22 PM 12/10/2024     3:10 PM 1/9/2025     1:44 PM   SHIVANI-7 SCORE   Total Score 13 (moderate anxiety) 11 (moderate anxiety)  13 (moderate anxiety) 13 (moderate anxiety)  13 (moderate anxiety)   Total Score 13    13 11 6 13  13  14 13        Patient-reported     CAGE-AID:       6/18/2024     8:04 AM   CAGE-AID Total Score   Total Score 1   Total Score MyChart 1 (A total score of 2 or greater is considered clinically significant)     PROMIS 10-Global Health (all questions and answers displayed):       4/3/2024     5:10 PM 6/18/2024     8:04 AM 7/11/2024     2:38 PM 7/18/2024     1:10 PM 8/30/2024     8:00 AM 9/17/2024     5:19 PM 1/9/2025     1:45 PM   PROMIS 10   In general, would you say your health is:  Good   Good  Fair    In general, would you say your quality of life is:  Very good   Very good  Good   In general, how would you rate your physical health?  Good   Fair  Fair   In general, how would you rate your mental health, including your mood and your ability to think?  Good   Fair  Good   In general, how would you rate your satisfaction with your social activities and relationships?  Very good   Good  Good   In general, please rate how well you carry out your usual social activities and roles  Good   Fair  Good   To what extent are you able to carry out your everyday physical activities such as walking, climbing stairs, carrying groceries, or moving a chair?  Completely   Mostly  Completely   In the past 7 days, how often have you been bothered by emotional problems such as feeling anxious, depressed, or irritable?  Sometimes   Often  Often   In the past 7 days, how would you rate your fatigue on average?  Moderate   Moderate  Moderate   In the past 7 days, how would you rate your pain on average, where 0 means no pain, and 10 means worst imaginable pain?  3   7  0   In general, would you say your health is:  3    3  3    In general, would you say your quality of life is:  4    4  3    In general, how would you rate your physical health?  3    2  3    In general, how would you rate your mental health, including your mood and your ability to think?  3    2  2    In general, how would you rate your satisfaction with your social activities and relationships?  4    3  3    In general, please rate how well you carry out your usual social activities and roles. (This includes activities at home, at work and in your community, and responsibilities as a parent, child, spouse, employee, friend, etc.)  3    2  3    To what extent are you able to carry out your everyday physical activities such as walking, climbing stairs, carrying groceries, or moving a chair?  5    4  3    In the past 7 days, how often have you been bothered by emotional  problems such as feeling anxious, depressed, or irritable?  3    4  3    In the past 7 days, how would you rate your fatigue on average?  3    3  2    In the past 7 days, how would you rate your pain on average, where 0 means no pain, and 10 means worst imaginable pain?  3    7  0    Global Mental Health Score      14   11    11 11    Global Physical Health Score      15   11    11 15    PROMIS TOTAL - SUBSCORES      29   22    22 26        Information is confidential and restricted. Go to Review Flowsheets to unlock data.    Proxy-reported     [place] Suicide Severity Rating Scale (Lifetime/Recent)      8/2/2023    11:00 AM 8/21/2023     2:00 PM 8/22/2023     8:00 AM 11/15/2023     7:51 PM 11/15/2023    10:31 PM 11/16/2023     9:15 AM 6/18/2024     8:47 AM   [place] Suicide Severity Rating (Lifetime/Recent)   Q1 Wished to be Dead (Past Month)    yes no no    Q2 Suicidal Thoughts (Past Month)    yes yes no    Q3 Suicidal Thought Method    yes yes     Q4 Suicidal Intent without Specific Plan    no no     Q5 Suicide Intent with Specific Plan    yes yes     Q6 Suicide Behavior (Lifetime)     yes     If yes to Q6, within past 3 months?     no yes    Level of Risk per Screen    high risk high risk     Q1 Wish to be Dead (Lifetime)       Y   Wish to be Dead Description (Lifetime)       in 8th grade,thoughts  and did something that she can't remember.   1. Wish to be Dead (Past 1 Month)       N   Q2 Non-Specific Active Suicidal Thoughts (Lifetime)       N   Most Severe Ideation Rating (Lifetime)       1   Most Severe Ideation Rating (Past 1 Month)      2 --   Frequency (Lifetime)       1   Frequency (Past 1 Month)      2 --   Duration (Lifetime)       1   Duration (Past 1 Month)      2 --   Controllability (Lifetime)       1   Controllability (Past 1 Month)      2 1   Deterrents (Lifetime)       0   Deterrents (Past 1 Month)      2 0   Reasons for Ideation (Lifetime)       0   Reasons for Ideation (Past 1 Month)      5 0    Actual Attempt (Lifetime)       N   Actual Attempt (Past 3 Months)      N    Has subject engaged in non-suicidal self-injurious behavior? (Lifetime)       N   Has subject engaged in non-suicidal self-injurious behavior? (Past 3 Months)      N    Interrupted Attempts (Lifetime)       N   Interrupted Attempts (Past 3 Months)      N    Aborted or Self-Interrupted Attempt (Lifetime)       N   Aborted or Self-Interrupted Attempt (Past 3 Months)      N    Preparatory Acts or Behavior (Lifetime)       N   Preparatory Acts or Behavior (Past 3 Months)      N    Most Lethal Attempt Date 6/21/2023 6/21/2023 6/21/2023       Actual Lethality/Medical Damage Code (Most Lethal Attempt) 0 0 0       Potential Lethality Code (Most Lethal Attempt) 1 1 1       Calculated C-SSRS Risk Score (Lifetime/Recent)      No Risk Indicated No Risk Indicated       ASSESSMENT: Current Emotional / Mental Status (status of significant symptoms):   Risk status (Self / Other harm or suicidal ideation)   Patient denies current fears or concerns for personal safety.   Patient reports the following current or recent suicidal ideation or behaviors: Thoughts of being better off dead, denies plan and intent.   Patient denies current or recent homicidal ideation or behaviors.   Patient reports current or recent self injurious behavior or ideation including thought of SIB, denies plan and intent .   Patient denies other safety concerns.   Patient reports there has been no change in risk factors since their last session.     Patient reports there has been no change in protective factors since their last session.     A safety and risk management plan has been developed including: Patient consented to co-developed safety plan on 6/18/2024.  Safety and risk management plan was reviewed.   Patient agreed to use safety plan should any safety concerns arise.  A copy was made available to the patient.  Patient denied needing to updated it.       Appearance:   Appropriate    Eye Contact:   Fair    Psychomotor Behavior: Normal    Attitude:   Cooperative    Orientation:   All   Speech    Rate / Production: Normal/ Responsive    Volume:  Normal    Mood:    Depressed  Irritable  Normal   Affect:    Subdued    Thought Content:  Clear  Rumination    Thought Form:  Coherent  Logical    Insight:    Good  and Fair      Medication Review:   No changes to current psychiatric medication(s)     Medication Compliance:   Yes     Changes in Health Issues:   None reported     Chemical Use Review:   Substance Use: Chemical use reviewed, no active concerns identified      Tobacco Use: No current tobacco use.      Diagnosis:  1. Schizoaffective disorder, depressive type (H)    2. ADHD (attention deficit hyperactivity disorder), combined type    3. SHIVANI (generalized anxiety disorder)    4. Cannabis use disorder      Collateral Reports Completed:   Not Applicable    PLAN: (Patient Tasks / Therapist Tasks / Other):  Your options until the next visit:  Patient will keep her goal to use marijuana only before bed time and michelle   Patient will share her goal with family for support  Patient will keep her plan to use a daily planner to help with memory and organization.  Patient's next visit is in 1 week.   Patient will be at home for next session.  Therapist will discuss ways to support client with attending the session for the full length of time.     USMAN Waters     ______________________________________________________________________    Individual Treatment Plan    Patient's Name: Lauren Montenegro  YOB: 2005    Date of Creation: 8/06/2024  Date Treatment Plan Last Reviewed/Revised: 1/13/2025    DSM5 Diagnoses: Attention-Deficit/Hyperactivity Disorder  314.01 (F90.2) Combined presentation, 295.70  (F25.1) Schizoaffective Disorder Depressive Type, or Substance-Related & Addictive Disorders 304.30 (F12.20) Cannabis Use Disorder Moderate  current  "use    Psychosocial / Contextual Factors:Per patient's father, \"Help with managing mental health challenges as noted in her chart. This also includes coaching on substance use, and support in the development of executive function skills. She has been working to overcome developmental issues since early childhood.\"     PROMIS (reviewed every 90 days): 27    Referral / Collaboration:  Referral to another professional/service is not indicated at this time..    Anticipated number of session for this episode of care: 9-12 sessions  Anticipation frequency of session: Biweekly  Anticipated Duration of each session: 38-52 minutes  Treatment plan will be reviewed in 90 days or when goals have been changed.     MeasurableTreatment Goal(s) related to diagnosis / functional impairment(s)  Goal 1: Patient will accept the marijuana use as a mood altering product that would affect her levels of functioning.     I will know I've met my goal when I have  cut use of 3 times of use daily and $ 75/ week spending to at least once a day and $ 75/month      Objective #A (Patient Action)    Patient will  smoke less and only at the bed time .  Status: Continued - Date(s): 1/13/2025    Intervention(s)  Therapist will teach emotional regulation skills. : new and healthy coping skills .    Objective #B  Patient will use daily planner at least 85 % of the time.  Status: Continued - Date(s): 1/13/2025    Intervention(s)  Therapist will teach tips for ADHD .    Goal 2: Patient will control or eliminate active psychotic symptoms, increase goal directed behaviors     I will know I've met my goal when I am  able to Id the negative symptoms and positive symptoms and replace them with healthy coping skills.      Objective #A (Patient Action)    Status: Continued - Date(s): 1/13/2025  Patient will  practice the reality check skills as needed .  Intervention(s)  Therapist will provide education around the symptoms of her condition using the Reality " check option .    Patient has reviewed and agreed to the above plan.    USMAN Waters  January 13, 2025

## 2025-01-23 ENCOUNTER — E-VISIT (OUTPATIENT)
Dept: FAMILY MEDICINE | Facility: CLINIC | Age: 20
End: 2025-01-23
Payer: COMMERCIAL

## 2025-01-23 DIAGNOSIS — G43.009 MIGRAINE WITHOUT AURA AND WITHOUT STATUS MIGRAINOSUS, NOT INTRACTABLE: Primary | ICD-10-CM

## 2025-01-23 RX ORDER — IBUPROFEN 800 MG/1
800 TABLET, FILM COATED ORAL EVERY 8 HOURS PRN
Qty: 60 TABLET | Refills: 1 | Status: SHIPPED | OUTPATIENT
Start: 2025-01-23

## 2025-01-23 RX ORDER — NAPROXEN 500 MG/1
500 TABLET ORAL 2 TIMES DAILY WITH MEALS
Qty: 60 TABLET | Refills: 1 | Status: SHIPPED | OUTPATIENT
Start: 2025-01-23 | End: 2025-01-23

## 2025-01-23 RX ORDER — SUMATRIPTAN SUCCINATE 25 MG/1
25 TABLET ORAL
Qty: 9 TABLET | Refills: 1 | Status: SHIPPED | OUTPATIENT
Start: 2025-01-23

## 2025-01-23 NOTE — PATIENT INSTRUCTIONS
Thank you for choosing us for your care. I have placed an order for a prescription so that you can start treatment. View your full visit summary for details by clicking on the link below. Your pharmacist will able to address any questions you may have about the medication.     If you're not feeling better within 5-7 days, please schedule an appointment.  You can schedule an appointment right here in [employer], or call [phone]  If the visit is for the same symptoms as your eVisit, we'll refund the cost of your eVisit if seen within seven days.    Thank you for choosing us for your care. Based on your symptoms and length of illness, I do not think that you need an antibiotic prescription at this time.  Please follow the care advise I ve provided and use the prescribed medication to help relieve your symptoms. View your full visit summary for details by clicking on the link below.     If you re not feeling better within 5-7 days, please respond to this message and we can consider if an antibiotic prescription is needed.  You can schedule an appointment right here in [employer], or call [phone]  If the visit is for the same symptoms as your eVisit, we ll refund the cost of your eVisit if seen within seven days  Headache: Care Instructions  Overview     Headaches have many possible causes. Most headaches aren't a sign of a more serious problem, and they will get better on their own. Home treatment may help you feel better faster.  The doctor has checked you carefully, but problems can develop later. If you notice any problems or new symptoms, get medical treatment right away.  Follow-up care is a key part of your treatment and safety. Be sure to make and go to all appointments, and call your doctor if you are having problems. It's also a good idea to know your test results and keep a list of the medicines you take.  How can you care for yourself at home?  Rest in a quiet, dark room until your headache is gone. Close  your eyes and try to relax or go to sleep. Don't watch TV or read.  Put a cold, moist cloth or cold pack on the painful area for 10 to 20 minutes at a time. Put a thin cloth between the cold pack and your skin.  Use a warm, moist towel or a heating pad set on low to relax tight shoulder and neck muscles.  Have someone gently massage your neck and shoulders.  Take pain medicines exactly as directed.  If the doctor gave you a prescription medicine for pain, take it as prescribed.  If you are not taking a prescription pain medicine, ask your doctor if you can take an over-the-counter medicine.  Do not ignore new symptoms that occur with a headache, such as a fever, weakness or numbness, vision changes, or confusion. These may be signs of a more serious problem.  To prevent headaches  Keep a headache diary so you can figure out what triggers your headaches. Avoiding triggers may help you prevent headaches. Record when each headache began, how long it lasted, and what the pain was like (throbbing, aching, stabbing, or dull). Write down any other symptoms you had with the headache, such as nausea, flashing lights or dark spots, or sensitivity to bright light or loud noise. Note if the headache occurred near your period. List anything that might have triggered the headache, such as certain foods (chocolate, cheese, wine) or odors, smoke, bright light, stress, or lack of sleep.  Find healthy ways to deal with stress. Headaches are most common during or right after stressful times. Take time to relax before and after you do something that has caused a headache in the past.  Try to keep your muscles relaxed by keeping good posture. Check your jaw, face, neck, and shoulder muscles for tension, and try relaxing them. When sitting at a desk, change positions often, and stretch for 30 seconds each hour.  Get plenty of sleep and exercise.  Eat regularly. Long periods without food can trigger a headache.  Limit caffeine by not  "drinking too much coffee, tea, or soda. But don't quit caffeine suddenly, because that can also give you headaches.  Reduce eyestrain from computers by blinking frequently and looking away from the computer screen every so often. Make sure you have proper eyewear and that your monitor is set up properly, about an arm's length away.  When should you call for help?   Call 911 anytime you think you may need emergency care. For example, call if:    You have signs of a stroke. These may include:  Sudden numbness, paralysis, or weakness in your face, arm, or leg, especially on only one side of your body.  Sudden vision changes.  Sudden trouble speaking.  Sudden confusion or trouble understanding simple statements.  Sudden problems with walking or balance.  A sudden, severe headache that is different from past headaches.   Call your doctor now or seek immediate medical care if:    You have a fever and a stiff neck.     You have new nausea and vomiting, or you cannot keep down food or fluids.     Your headache gets much worse.   Watch closely for changes in your health, and be sure to contact your doctor if:    Your headaches get worse, happen more often, or change in some way.     You have new symptoms.     Your life is disrupted by your headaches. For example, you often miss work, school, or other activities.     You do not get better as expected.   Where can you learn more?  Go to https://www.[employer].net/patiented  Enter M271 in the search box to learn more about \"Headache: Care Instructions.\"  Current as of: December 20, 2023  Content Version: 14.3    2024 [employer].   Care instructions adapted under license by your healthcare professional. If you have questions about a medical condition or this instruction, always ask your healthcare professional. [employer] disclaims any warranty or liability for your use of this information.    "

## 2025-01-24 ENCOUNTER — VIRTUAL VISIT (OUTPATIENT)
Dept: PSYCHIATRY | Facility: CLINIC | Age: 20
End: 2025-01-24
Payer: COMMERCIAL

## 2025-01-24 DIAGNOSIS — F25.1 SCHIZOAFFECTIVE DISORDER, DEPRESSIVE TYPE (H): Primary | ICD-10-CM

## 2025-01-24 DIAGNOSIS — F41.1 GENERALIZED ANXIETY DISORDER: ICD-10-CM

## 2025-01-24 DIAGNOSIS — F17.200 TOBACCO USE DISORDER: ICD-10-CM

## 2025-01-24 DIAGNOSIS — F90.2 ADHD (ATTENTION DEFICIT HYPERACTIVITY DISORDER), COMBINED TYPE: ICD-10-CM

## 2025-01-24 DIAGNOSIS — F12.90 CANNABIS USE DISORDER: ICD-10-CM

## 2025-01-24 PROCEDURE — 90834 PSYTX W PT 45 MINUTES: CPT | Mod: 95 | Performed by: PSYCHOLOGIST

## 2025-01-24 NOTE — PROGRESS NOTES
"Virtual Visit Details    Type of service:  Video Visit     Originating Location (pt. Location): Home    Distant Location (provider location):  On-site  Platform used for Video Visit: [facility]     Individual Psychotherapy Progress Note    Date: Jan 24, 2025    Patient Name: Lauren Montenegro     Preferred Name: \"Jacqueline\"    Patient Pronouns: She/Her, They/Them    Patient MRN: [id number]    Provider: Daniel Landauer, PhD, LP    Procedure: Individual DBT session    Appointment Duration: 3:00 to 340 PM (40 minutes)    People Present: Jacqueline and Dr. Landauer    Diagnosis:   Encounter Diagnoses   Name Primary?    Schizoaffective disorder, depressive type (H) Yes    ADHD (attention deficit hyperactivity disorder), combined type     Cannabis use disorder     Generalized anxiety disorder     Tobacco use disorder        Treatment Plan                                                                                              Problem 1: Jacqueline has significant difficulty regulating strong emotions and impulses effectively. This difficulty has contributed to history of suicidal ideation, multiple suicide attempts, self-harm behavior, risk-taking behaviors (including substance use), angry outbursts, and interpersonal relationship challenges.     Goal Jacqueline will demonstrate at least a 75% decrease in frequency and intensity of SIB and SI and will learn and utilize effectively alternative emotion regulation, distress tolerance, and impulse control strategies 75% of the time when needed prior to discharge..   Intervention Jacqueline will participate in full-model DBT including individual and family therapy and skills group in order to develop appropriate and effective emotion regulation, distress tolerance and impulse control skills.   Progress: Some progress. Jacqueline has demonstrated some overall improvement in emotion regulation and distress tolerance skills. There has been a " "significant decrease in frequency and intensity of suicidal ideation. Though she continues to experience some urges to self-harm, it has been several months since last engagement in SIB.  She still struggles with impulse control when emotions are intense, especially related to substance use and making risky/unsafe choices.  Target Date: 2/1/2024     Problem 2: Jacqueline reports experiencing worsening symptoms of depression and anxiety. These symptoms have contributed to and are exacerbated by interpersonal problems, academic problems, low self-esteem, and use of ineffective coping skills.     Goal \"I want to be happy, not looking at all negatives.\"     Jacqueline will demonstrate an increase in behavioral activation and effective coping by engaging in family and/or peer activities at least 2x per week, engaging in at least one pleasant or success activity per day, and use of coping skills in stressful situations at least 75% of the time..   Intervention Jacqueline will participate in full-model DBT including individual and family therapy and skills group in order to increase behavioral activation, develop stress management skills, improve mood, improve self-esteem, and manage anxiety.   Progress: Some progress. Jacqueline' mood has fluctuated since starting therapy. More recently she has indicated feeling more depressed and stressed and having increases in negative self-talk and decreases in self-esteem. She often worries that she is a burden on her family because of her mental health and substance use issues and feeling like she is always messing things up. She can experience dawood and has demonstrated overall improvement in motivation. She continues to struggle with sticking to plans to  improve healthy habits.  Target Date: 2/1/2024     Problem 3: Jacqueline has an extensive history of substance use and abuse, which has contributed to significant  interpersonal stress, academic issues, and depression and anxiety symptoms. She has had " "difficulty with maintaining sobriety despite these consequences. She has indicated little motivation to stay sober from Marijuana     Goal \"I want skills not to relapse.\"  Jacqueline will refrain from alcohol and drug use. She will learn and utilize alternative coping skills to substance use and will develop efficacy with refusing substances in peer situations, such that she is able to maintain 100% sobriety from alcohol, marijuana, and other drugs by the end of DBT programming   Intervention Jacqueline will participate in full-model DBT including individual and family therapy and skills group in order to develop effective distress tolerance and impulse control skills, so that she can resist urges to use substances, increase ability to make Wise Mind decisions about substance use, and increase awareness of factors that contribute to urges to use substances.   Progress: Intermittent progress. Jacqueline's dedication sobriety has waned over the last several months and she no longer wants to stay sober from marijuana. She continues to deny desire to use any other substances and states that she plans to stay clean from other drugs. She believes that she is able to regulate her marijuana use and not be dependent on it, though it is not clear if she is actually capable of only using marijuana recreationally. She is currently pursuing medical marijuana; she believes that this will be a safer way for her to use.  Target Date: 2/1/2024        Treatment Plan Completed: 10/06/2023  Treatment Plan Updated/Reviewed: 11/22/204  Treatment Plan Review Due: 2/01/2024  Session Content                                                                                               Subjective:  Jacqueline reported that she has been feeling sick and that is why the appointment was moved to virtual today. She expressed frustration with parents because though she has been feeling sick she does not think that it is severe enough to warrant an urgent care or ER " visit. She noted that parents took her to ER earlier today but eventually left because of the wait. She wants to be able to see her boyfriend tonight; she thinks that he will help enhance her mood and he will take care of her. However, parents told her that if she is feeling so sick that they thought she might need emergency attention, then she is not well enough to go to see her boyfriend tonight. She expressed that she is feeling less suicidal since last week and things have calmed down overall.    Objective/Intervention:   Clinician utilized a DBT approach during the session. Clinician and Jacqueline reviewed the events since the last session. Clinician and Jacqueline assessed for safety issues and reviewed factors that have led to decreased SI over the past few days. Clinician and Jacqueline then addressed her frustration with her parents and reviewed DBT skills that she can utilize to either to continue to try to convince her parents to  be okay with her going out or to accept their answer and accept the fact that she will have to stay home today.     Assessment:   Jacqueline initially presented as alert, engaged, and frustrated during the session. She did appear to be having cold-like symptoms. She was able to acknowledge that she was still feeling sick and it is possible that going to see her boyfriend might not be the best thing for taking care of her physical health and she insisted that it would be helpful for her mental health. She was able to identify some strategies she can use to try to communicate with her parents about her wants, though she still can struggle with implementing interpersonal effectiveness skills some of the time. It is encouraging that the intensity and frequency of SI is lower this week given her presentation last week. She was able to contract for safety and she denied plan or intent to act on SI.          Primary Targets Addressed in This Session:  Life-Threatening Behavior: Suicidal Ideation  Therapy  Interfering Behavior:   Quality of Life: Interpersonal relationships, family issues, Healthy habits, self-esteem    DBT Skills reviewed or taught in Session:    ABC PLEASE skills, ACCEPTS/Self-soothe, Opposite Action, Pros and Cons, checking the facts, STOP, DEAR MAN    Patient Used Phone Coaching Since Last Session: No     Chain Analysis/Solution Analysis? No    Behavioral Assignments:  1) Utilize learned DBT Skills  2) Engage in daily exercise, eat 3 meals a day, practice good sleep hygiene.     Plan: Patient will continue in individual DBT until she is able to transition to another therapist for longer-term care.    Mental Status                                                                                                Behavioral Observations/Mental Status: Patient arrived on time to session. Patient was adequately groomed. Eye contact was adequate. Mood today was fatigued. Observed affect was full range. Speech was regular rate and rhythm. Thought process was Logical and Linear. Patient was actively engaged in session.  Risk Assessment:     See above for current review of risk.    Jacqueline has a long history dating back to early adolescence of suicidal ideation, self-harm behavior, and multiple suicide attempts. Most recent suicide attempt occurred in June 2023 while she was in residential substance use treatment. This incident was triggered by a combination of being bullied by other residents and frustration with not being able to go home. Previous suicide attempts have been of an impulsive nature and are usually triggered by a specific event. She has tried hanging herself 3 times and drowning herself 1 time. She last engaged in self-injury about a month prior to this assessment. At the time of the assessment she is denying experiencing active suicidal thoughts and denying significant urges to self-injure. She denied  experiencing passive suicidal ideation in recent weeks and denied any in the last week.      At the time of the assessment, Jacqueline identifies several reasons for living and demonstrates future-oriented thinking. She indicated that she is confident that she will not attempt to kill herself again, especially if she is able to stay in outpatient care. She identifies family and one friend as sources of social support who she can reach out to if she needs help or a distraction. She is able to identify other distractions and coping skills she can use if ideation gets more intense.     Based on risk and protective factors, patient is assessed to be at a Low Acute Risk (i.e., no current suicidal intent, specific plan, preparatory behaviors, and high confidence in patient's ability to maintain safety). She is likely at intermediate chronic risk for suicidal behavior given her challenges with emotion regulation and impulse control.    Daniel Landauer, PhD, LP

## 2025-01-24 NOTE — NURSING NOTE
Current patient location: [facility] [address]  [facility] [id number]    Is the patient currently in the state of [place]? YES    Visit mode: VIDEO    If the visit is dropped, the patient can be reconnected by:VIDEO VISIT: Text to cell phone:   Telephone Information:   Mobile [phone]       Will anyone else be joining the visit? NO  (If patient encounters technical issues they should call [phone]    Are changes needed to the allergy or medication list? No    Are refills needed on medications prescribed by this physician? NO    Rooming Documentation:  Not applicable    Reason for visit: RECHMARIANO SOTELO

## 2025-01-27 ENCOUNTER — TELEPHONE (OUTPATIENT)
Dept: PSYCHIATRY | Facility: CLINIC | Age: 20
End: 2025-01-27
Payer: COMMERCIAL

## 2025-01-27 NOTE — TELEPHONE ENCOUNTER
SW Intern left voice mail for Mandie, patient's mom, ([phone]) offering care coordination support.    Plan: will follow up by phone next month or earlier as requested

## 2025-01-27 NOTE — PROGRESS NOTES
"    [facility]    Dialectical Behavior Therapy Program    DBT Individual Psychotherapy Progress Note    Date: Jan 17, 2025    Patient Name: Lauren Montenegro     Preferred Name: \"Jacqueline\"    Patient Pronouns: She/Her, They/Them    Patient MRN: [id number]    Provider: Daniel Landauer, PhD, LP    Procedure: Individual DBT session, Interactive Complexity add-on: coded due to disclosure of a sentinel event as follows: suicidal ideation with plan that required spending time on safety planning with Jacqueline and her father.     Appointment Duration: 3:00 to 3:50 PM (50 minutes)    People Present: Jacqueline and Dr. Landauer    Diagnosis:   Encounter Diagnoses   Name Primary?    Schizoaffective disorder, depressive type (H) Yes    ADHD (attention deficit hyperactivity disorder), combined type     Cannabis use disorder     Generalized anxiety disorder     Tobacco use disorder        Treatment Plan                                                                                              Problem 1: Jacqueline has significant difficulty regulating strong emotions and impulses effectively. This difficulty has contributed to history of suicidal ideation, multiple suicide attempts, self-harm behavior, risk-taking behaviors (including substance use), angry outbursts, and interpersonal relationship challenges.     Goal Jacqueline will demonstrate at least a 75% decrease in frequency and intensity of SIB and SI and will learn and utilize effectively alternative emotion regulation, distress tolerance, and impulse control strategies 75% of the time when needed prior to discharge..   Intervention Jacqueline will participate in full-model DBT including individual and family therapy and skills group in order to develop appropriate and effective emotion regulation, distress tolerance and impulse control skills.   Progress: Some progress. Jacqueline has demonstrated some overall improvement in emotion regulation and " "distress tolerance skills. There has been a significant decrease in frequency and intensity of suicidal ideation. Though she continues to experience some urges to self-harm, it has been several months since last engagement in SIB.  She still struggles with impulse control when emotions are intense, especially related to substance use and making risky/unsafe choices.  Target Date: 2/1/2024     Problem 2: Jacqueline reports experiencing worsening symptoms of depression and anxiety. These symptoms have contributed to and are exacerbated by interpersonal problems, academic problems, low self-esteem, and use of ineffective coping skills.     Goal \"I want to be happy, not looking at all negatives.\"     Jacqueline will demonstrate an increase in behavioral activation and effective coping by engaging in family and/or peer activities at least 2x per week, engaging in at least one pleasant or success activity per day, and use of coping skills in stressful situations at least 75% of the time..   Intervention Jacqueline will participate in full-model DBT including individual and family therapy and skills group in order to increase behavioral activation, develop stress management skills, improve mood, improve self-esteem, and manage anxiety.   Progress: Some progress. Jacqueline' mood has fluctuated since starting therapy. More recently she has indicated feeling more depressed and stressed and having increases in negative self-talk and decreases in self-esteem. She often worries that she is a burden on her family because of her mental health and substance use issues and feeling like she is always messing things up. She can experience dawood and has demonstrated overall improvement in motivation. She continues to struggle with sticking to plans to  improve healthy habits.  Target Date: 2/1/2024     Problem 3: Jacqueline has an extensive history of substance use and abuse, which has contributed to significant  interpersonal stress, academic issues, and " "depression and anxiety symptoms. She has had difficulty with maintaining sobriety despite these consequences. She has indicated little motivation to stay sober from Marijuana     Goal \"I want skills not to relapse.\"  Jacqueline will refrain from alcohol and drug use. She will learn and utilize alternative coping skills to substance use and will develop efficacy with refusing substances in peer situations, such that she is able to maintain 100% sobriety from alcohol, marijuana, and other drugs by the end of DBT programming   Intervention Jacqueline will participate in full-model DBT including individual and family therapy and skills group in order to develop effective distress tolerance and impulse control skills, so that she can resist urges to use substances, increase ability to make Wise Mind decisions about substance use, and increase awareness of factors that contribute to urges to use substances.   Progress: Intermittent progress. Jacqueline's dedication sobriety has waned over the last several months and she no longer wants to stay sober from marijuana. She continues to deny desire to use any other substances and states that she plans to stay clean from other drugs. She believes that she is able to regulate her marijuana use and not be dependent on it, though it is not clear if she is actually capable of only using marijuana recreationally. She is currently pursuing medical marijuana; she believes that this will be a safer way for her to use.  Target Date: 2/1/2024        Treatment Plan Completed: 10/06/2023  Treatment Plan Updated/Reviewed: 11/22/204  Treatment Plan Review Due: 2/01/2024  Session Content                                                                                               Subjective:  Jacqueline reported that she has been experiencing suicidal thoughts and urges over the past week. She has been thinking about overdosing or jumping off of something. She does not have a specific plan, but indicated that " she may act on her urges if she gets the change. She shared that her SI has increased in the past week due to both ongoing issues with her boyfriend and conflict with her parents. She stated that parents have asked that a skills worker come to the house every day to help Jacqueline with basic tasks in order to help her be more independent. Jacqueline feels overwhelmed by this as she feels like it will eliminate all time that she has to herself and she also feels like she does not need this type of help. She wants to be more independent, but does not think that this is helping. When she has tried talking to her parents about this, she feels like parents just brush off her concerns and don't listen. Father shared that this decision was made because Jacqueline has stopped caring for herself (e.g. not showering, etc.) and parents felt like she needed in-the-moment support that parents could not offer effectively. He stated that how often the worker will be there has not been decided yet. Father shared that they are also working on finding Jacqueline some opportunities to get work experience and practical skills through her current school. Jacqueline agreed that those things could make her school experience less stressful. Jacqueline stated that she will try to keep herself safe this week and she will contact Clinician if ideation worsens. Parents will also monitor Jacqueline more closely and remove access to medications.    Objective/Intervention:   Clinician utilized a DBT approach during the session. Clinician and [place] reviewed the events since the last session. Clinician and Jacqueline assessed for safety issues. Clinician and Jacqueline addressed her increasing suicidal ideation and engaged in safety planning. Clinician and [place] explored the recent events that have contributed to increased distress and increased desire to be dead or disappear. Clinician and Jacqueline processed her thoughts and feelings about the skills worker and her feeling like she has no say  in her life right now. Clinician and Jacqueline explored the barriers to her doing some of her basic life tasks right now. Clinician and Jacqueline continued discussion about her relationship with her boyfriend and her frequent negative thoughts about herself and her worthiness. Clinician then met with Jacqueline and her father to discuss safety planning for the next week.    Assessment:   Jacqueline presented as alert, engaged, but very distraught during the session. She vacillated between being very sad and then very angry (when she was talking about the skills worker starting). She appeared to be overwhelmed by the addition of this extra service and it also seems that adding this service has served to amplify her negative beliefs about herself and her capabilities. She was able to contract for safety and father indicated willingness to take extra precautions with her this week.    Primary Targets Addressed in This Session:  Life-Threatening Behavior: Suicidal Ideation  Therapy Interfering Behavior: Substance use  Quality of Life: Interpersonal relationships, family issues, Healthy habits, self-esteem    DBT Skills reviewed or taught in Session:    ABC PLEASE skills, ACCEPTS/Self-soothe, Opposite Action, Pros and Cons, checking the facts, STOP, DEAR MAN    Patient Used Phone Coaching Since Last Session: No     Chain Analysis/Solution Analysis? No    Behavioral Assignments:  1) Utilize learned DBT Skills  2) Engage in daily exercise, eat 3 meals a day, practice good sleep hygiene.     Plan: Patient will continue in individual DBT until she is able to transition to another therapist for longer-term care.    Mental Status                                                                                                Behavioral Observations/Mental Status: Patient arrived on time to session. Patient was adequately groomed. Eye contact was adequate. Mood today was distressed and sad. Observed affect was full range. Speech was regular rate  and rhythm. Thought process was Logical and Linear. Patient was actively engaged in session.  Risk Assessment:     See above for current review of risk.    Jacqueline has a long history dating back to early adolescence of suicidal ideation, self-harm behavior, and multiple suicide attempts. Most recent suicide attempt occurred in June 2023 while she was in residential substance use treatment. This incident was triggered by a combination of being bullied by other residents and frustration with not being able to go home. Previous suicide attempts have been of an impulsive nature and are usually triggered by a specific event. She has tried hanging herself 3 times and drowning herself 1 time. She last engaged in self-injury about a month prior to this assessment. At the time of the assessment she is denying experiencing active suicidal thoughts and denying significant urges to self-injure. She denied  experiencing passive suicidal ideation in recent weeks and denied any in the last week.     At the time of the assessment, Jacqueline identifies several reasons for living and demonstrates future-oriented thinking. She indicated that she is confident that she will not attempt to kill herself again, especially if she is able to stay in outpatient care. She identifies family and one friend as sources of social support who she can reach out to if she needs help or a distraction. She is able to identify other distractions and coping skills she can use if ideation gets more intense.     Based on risk and protective factors, patient is assessed to be at a Low Acute Risk (i.e., no current suicidal intent, specific plan, preparatory behaviors, and high confidence in patient's ability to maintain safety). She is likely at intermediate chronic risk for suicidal behavior given her challenges with emotion regulation and impulse control.    Daniel Landauer, PhD, LP

## 2025-01-29 ENCOUNTER — OFFICE VISIT (OUTPATIENT)
Dept: URGENT CARE | Facility: URGENT CARE | Age: 20
End: 2025-01-29
Payer: COMMERCIAL

## 2025-01-29 VITALS
WEIGHT: 166 LBS | HEART RATE: 100 BPM | RESPIRATION RATE: 18 BRPM | BODY MASS INDEX: 31.37 KG/M2 | TEMPERATURE: 99.1 F | OXYGEN SATURATION: 96 % | DIASTOLIC BLOOD PRESSURE: 77 MMHG | SYSTOLIC BLOOD PRESSURE: 113 MMHG

## 2025-01-29 DIAGNOSIS — J02.0 STREP THROAT: ICD-10-CM

## 2025-01-29 DIAGNOSIS — H65.91 OME (OTITIS MEDIA WITH EFFUSION), RIGHT: Primary | ICD-10-CM

## 2025-01-29 DIAGNOSIS — R09.81 NASAL CONGESTION: ICD-10-CM

## 2025-01-29 LAB — DEPRECATED S PYO AG THROAT QL EIA: NEGATIVE

## 2025-01-29 PROCEDURE — 87651 STREP A DNA AMP PROBE: CPT | Performed by: EMERGENCY MEDICINE

## 2025-01-29 PROCEDURE — 99213 OFFICE O/P EST LOW 20 MIN: CPT | Performed by: EMERGENCY MEDICINE

## 2025-01-29 RX ORDER — FLUTICASONE PROPIONATE 50 MCG
1 SPRAY, SUSPENSION (ML) NASAL DAILY
Qty: 11.1 ML | Refills: 0 | Status: SHIPPED | OUTPATIENT
Start: 2025-01-29

## 2025-01-29 NOTE — PROGRESS NOTES
Assessment & Plan     Diagnosis:    ICD-10-CM    1. OME (otitis media with effusion), right  H65.91 Streptococcus A Rapid Screen w/Reflex to PCR - Clinic Collect     Group A Streptococcus PCR Throat Swab     fluticasone (FLONASE) 50 MCG/ACT nasal spray      2. Nasal congestion  R09.81 fluticasone (FLONASE) 50 MCG/ACT nasal spray          Medical Decision Making:  Lauren Montenegro is a 19 year old adult who presents for evaluation of otalgia on the right side.  The patient has an exam consistent with OME and swollen nasal turbinates; no signs of infection, mass, cholesteatoma, etc. Given exam, the most likely etiology of the pain is secondary to eustachian tube dysfunction and OME.  Will start Flonase, she may take Tylenol or Ibuprofen for pain.  Return if increasing pain, fever, decrease in hearing or ear discharge.  Follow-up with primary physician in 7-10 days, if symptoms persist then an ENT consultation may be needed as outpatient.      Peewee Simms PA-C  [facility]    Subjective     Lauren Montenegro is a 19 year old adult who presents to clinic today for the following health issues:  Chief Complaint   Patient presents with    Urgent Care     Pt present with R ear pain and diarrhea, onset 2 weeks.        HPI  Patient last 2 weeks had a minor cough, upper respiratory infection.  Mo symptoms have cleared up except her right ear has had some pain that waxes and wanes in severity.  She has also had some nasal congestion, some difficulty breathing through her nose.  No discharge from the ear, fevers, sore throat, headache, neck pain, cough, difficulty swallowing or breathing or other concerns.    Review of Systems    See HPI    Objective      Vitals: /77   Pulse 100   Temp 99.1  F (37.3  C) (Tympanic)   Resp 18   Wt 75.3 kg (166 lb)   LMP 11/08/2024 (Approximate)   SpO2 96%   BMI 31.37 kg/m        Patient Vitals for the past 24 hrs:   BP Temp Temp src Pulse Resp SpO2 Weight    01/29/25 1726 113/77 99.1  F (37.3  C) Tympanic 100 18 96 % 75.3 kg (166 lb)       Vital signs reviewed by: Peewee Simms PA-C    Physical Exam   Constitutional: Patient is alert and cooperative. No acute distress.  Ears: Right TM is slightly bulging with effusion noted behind the TM.  No erythema.  No perforation.  No mass or tenderness. Left TM is normal. External ear canals are normal.  Mouth: Mucous membranes are moist. Normal tongue and tonsil. Posterior oropharynx is clear.  Nose: Nasal turbinates are swollen and erythematous, right greater than left.  No septal mass or epistaxis.  Cardiovascular: Regular rate and rhythm  Pulmonary/Chest: Lungs are clear to auscultation throughout. Effort normal. No respiratory distress. No wheezes, rales or rhonchi.  Neurological: Alert and oriented x3.   Psychiatric:The patient has a normal mood and affect.     Labs/Imaging:  Results for orders placed or performed in visit on 01/29/25   Streptococcus A Rapid Screen w/Reflex to PCR - Clinic Collect     Status: Normal    Specimen: Throat; Swab   Result Value Ref Range    Group A Strep antigen Negative Negative       Peewee Simms PA-C, January 29, 2025

## 2025-01-30 LAB — S PYO DNA THROAT QL NAA+PROBE: DETECTED

## 2025-01-30 RX ORDER — PENICILLIN V POTASSIUM 500 MG/1
500 TABLET, FILM COATED ORAL 2 TIMES DAILY
Qty: 20 TABLET | Refills: 0 | Status: SHIPPED | OUTPATIENT
Start: 2025-01-30 | End: 2025-02-09

## 2025-01-31 ENCOUNTER — VIRTUAL VISIT (OUTPATIENT)
Dept: PSYCHIATRY | Facility: CLINIC | Age: 20
End: 2025-01-31
Payer: COMMERCIAL

## 2025-01-31 DIAGNOSIS — F41.1 GENERALIZED ANXIETY DISORDER: ICD-10-CM

## 2025-01-31 DIAGNOSIS — F12.90 CANNABIS USE DISORDER: ICD-10-CM

## 2025-01-31 DIAGNOSIS — F90.2 ADHD (ATTENTION DEFICIT HYPERACTIVITY DISORDER), COMBINED TYPE: ICD-10-CM

## 2025-01-31 DIAGNOSIS — F17.200 TOBACCO USE DISORDER: ICD-10-CM

## 2025-01-31 DIAGNOSIS — F25.1 SCHIZOAFFECTIVE DISORDER, DEPRESSIVE TYPE (H): Primary | ICD-10-CM

## 2025-01-31 PROCEDURE — 90834 PSYTX W PT 45 MINUTES: CPT | Mod: 95 | Performed by: PSYCHOLOGIST

## 2025-01-31 NOTE — PROGRESS NOTES
"Virtual Visit Details    Type of service:  Video Visit     Originating Location (pt. Location): Home    Distant Location (provider location):  On-site  Platform used for Video Visit: [facility]     Individual Psychotherapy Progress Note    Date: Jan 31, 2025    Patient Name: Lauren Montenegro     Preferred Name: \"Jacqueline\"    Patient Pronouns: She/Her, They/Them    Patient MRN: [id number]    Provider: Daniel Landauer, PhD, LP    Procedure: Individual DBT session    Appointment Duration: 3:00 to 3:40 PM (40 minutes)    People Present: Jacqueline and Dr. Landauer    Diagnosis:   Encounter Diagnoses   Name Primary?    Schizoaffective disorder, depressive type (H) Yes    ADHD (attention deficit hyperactivity disorder), combined type     Cannabis use disorder     Generalized anxiety disorder     Tobacco use disorder          Treatment Plan                                                                                              Problem 1: Jacqueline has significant difficulty regulating strong emotions and impulses effectively. This difficulty has contributed to history of suicidal ideation, multiple suicide attempts, self-harm behavior, risk-taking behaviors (including substance use), angry outbursts, and interpersonal relationship challenges.     Goal Jacqueline will demonstrate at least a 75% decrease in frequency and intensity of SIB and SI and will learn and utilize effectively alternative emotion regulation, distress tolerance, and impulse control strategies 75% of the time when needed prior to discharge..   Intervention Jacqueline will participate in full-model DBT including individual and family therapy and skills group in order to develop appropriate and effective emotion regulation, distress tolerance and impulse control skills.   Progress: Some progress. Jacqueline has demonstrated some overall improvement in emotion regulation and distress tolerance skills. There has been a " "significant decrease in frequency and intensity of suicidal ideation. Though she continues to experience some urges to self-harm, it has been several months since last engagement in SIB.  She still struggles with impulse control when emotions are intense, especially related to substance use and making risky/unsafe choices.  Target Date: 2/1/2024     Problem 2: Jacqueline reports experiencing worsening symptoms of depression and anxiety. These symptoms have contributed to and are exacerbated by interpersonal problems, academic problems, low self-esteem, and use of ineffective coping skills.     Goal \"I want to be happy, not looking at all negatives.\"     Jacqueline will demonstrate an increase in behavioral activation and effective coping by engaging in family and/or peer activities at least 2x per week, engaging in at least one pleasant or success activity per day, and use of coping skills in stressful situations at least 75% of the time..   Intervention Jacqueline will participate in full-model DBT including individual and family therapy and skills group in order to increase behavioral activation, develop stress management skills, improve mood, improve self-esteem, and manage anxiety.   Progress: Some progress. Jacqueline' mood has fluctuated since starting therapy. More recently she has indicated feeling more depressed and stressed and having increases in negative self-talk and decreases in self-esteem. She often worries that she is a burden on her family because of her mental health and substance use issues and feeling like she is always messing things up. She can experience dawood and has demonstrated overall improvement in motivation. She continues to struggle with sticking to plans to  improve healthy habits.  Target Date: 2/1/2024     Problem 3: Jacqueline has an extensive history of substance use and abuse, which has contributed to significant  interpersonal stress, academic issues, and depression and anxiety symptoms. She has had " "difficulty with maintaining sobriety despite these consequences. She has indicated little motivation to stay sober from Marijuana     Goal \"I want skills not to relapse.\"  Jacqueline will refrain from alcohol and drug use. She will learn and utilize alternative coping skills to substance use and will develop efficacy with refusing substances in peer situations, such that she is able to maintain 100% sobriety from alcohol, marijuana, and other drugs by the end of DBT programming   Intervention Jacqueline will participate in full-model DBT including individual and family therapy and skills group in order to develop effective distress tolerance and impulse control skills, so that she can resist urges to use substances, increase ability to make Wise Mind decisions about substance use, and increase awareness of factors that contribute to urges to use substances.   Progress: Intermittent progress. Jacqueline's dedication sobriety has waned over the last several months and she no longer wants to stay sober from marijuana. She continues to deny desire to use any other substances and states that she plans to stay clean from other drugs. She believes that she is able to regulate her marijuana use and not be dependent on it, though it is not clear if she is actually capable of only using marijuana recreationally. She is currently pursuing medical marijuana; she believes that this will be a safer way for her to use.  Target Date: 2/1/2024        Treatment Plan Completed: 10/06/2023  Treatment Plan Updated/Reviewed: 11/22/204  Treatment Plan Review Due: 2/01/2024  Session Content                                                                                               Subjective:  Jacqueline reported that she is feeling better overall than last week, but her cold symptoms are still lingering. She noted that her tongue was hurting a lot today which she attributed to biting her tongue in her sleep; she noted that she has a tendency to this. This " pain has been distracting for her today. She shared that she did go to school every day this week and was able to tolerate it which is a change over the last several weeks. She noted that she is not as upset about the in-home skills worker as she was a couple of weeks ago. The worker is only there a couple times per week and not everyday as she feared. She acknowledged that the worker has been helpful in some respects. She thinks that her relationship with her boyfriend is a little better. She is planning to spend the night at his house and celebrate his mother's birthday tomorrow. However, she still has not asked her parents if she can go yet. She reported that suicidal ideation has been minimal this week and she thinks that she has been more busy and too focused on physical health to ruminate about the things that contribute to her having suicidal ideation.    Objective/Intervention:   Clinician utilized a DBT approach during the session. Clinician and Jacqueline reviewed the events since the last session. Clinician and Jacqueline assessed for safety issues and explored decrease in SI in past couple of weeks. Clinician and Jacqueline explored the pros and cons related to her decision last week to see her boyfriend even though her parents did not give her permission to do so. Clinician and Jacqueline discussed the importance of communicating effectively with parents and continuing to work to regain their trust if she wants more freedom and independence.    Assessment:   Jacqueline  presented as alert and engaged, but she was very distracted by her tongue pain. She indicated that the pain made it hard to talk and she was trying some different remedies to ease the pain. Session wrapped up earlier than usual due to her challenges with focusing while in pain. Despite ongoing physical health issues, affect was brighter today than the last few weeks and she was able to identify some positive experiences in the last week.    Primary Targets  Addressed in This Session:  Life-Threatening Behavior: Suicidal Ideation  Therapy Interfering Behavior:   Quality of Life: Interpersonal relationships, family issues, Healthy habits, self-esteem    DBT Skills reviewed or taught in Session:    ABC PLEASE skills, ACCEPTS/Self-soothe, Opposite Action, Pros and Cons, checking the facts, STOP, DEAR MAN    Patient Used Phone Coaching Since Last Session: No     Chain Analysis/Solution Analysis? No    Behavioral Assignments:  1) Utilize learned DBT Skills  2) Engage in daily exercise, eat 3 meals a day, practice good sleep hygiene.     Plan: Patient will continue in individual DBT until she is able to transition to another therapist for longer-term care.    Mental Status                                                                                                Behavioral Observations/Mental Status: Patient arrived on time to session. Patient was adequately groomed. Eye contact was adequate. Mood today was pleasant and pained. Observed affect was full range. Speech was regular rate and rhythm. Thought process was Logical and Linear. Patient was actively engaged in session.  Risk Assessment:     See above for current review of risk.    Jacqueline has a long history dating back to early adolescence of suicidal ideation, self-harm behavior, and multiple suicide attempts. Most recent suicide attempt occurred in June 2023 while she was in residential substance use treatment. This incident was triggered by a combination of being bullied by other residents and frustration with not being able to go home. Previous suicide attempts have been of an impulsive nature and are usually triggered by a specific event. She has tried hanging herself 3 times and drowning herself 1 time. She last engaged in self-injury about a month prior to this assessment. At the time of the assessment she is denying experiencing active suicidal thoughts and denying significant urges to self-injure. She denied   experiencing passive suicidal ideation in recent weeks and denied any in the last week.     At the time of the assessment, Jacqueline identifies several reasons for living and demonstrates future-oriented thinking. She indicated that she is confident that she will not attempt to kill herself again, especially if she is able to stay in outpatient care. She identifies family and one friend as sources of social support who she can reach out to if she needs help or a distraction. She is able to identify other distractions and coping skills she can use if ideation gets more intense.     Based on risk and protective factors, patient is assessed to be at a Low Acute Risk (i.e., no current suicidal intent, specific plan, preparatory behaviors, and high confidence in patient's ability to maintain safety). She is likely at intermediate chronic risk for suicidal behavior given her challenges with emotion regulation and impulse control.    Daniel Landauer, PhD, LP

## 2025-01-31 NOTE — NURSING NOTE
Current patient location: Patient declined to provide     Is the patient currently in the state of [place]? YES    Visit mode: VIDEO    If the visit is dropped, the patient can be reconnected by:VIDEO VISIT: Text to cell phone:   Telephone Information:   Mobile [phone]       Will anyone else be joining the visit? NO  (If patient encounters technical issues they should call [phone]    Are changes needed to the allergy or medication list? No    Are refills needed on medications prescribed by this physician? NO    Rooming Documentation:  Not applicable    Reason for visit: RECHECK (F/u)    Irena RUSSELLF

## 2025-02-12 ENCOUNTER — APPOINTMENT (OUTPATIENT)
Dept: PSYCHIATRY | Facility: CLINIC | Age: 20
End: 2025-02-12
Attending: SOCIAL WORKER
Payer: COMMERCIAL

## 2025-02-12 DIAGNOSIS — F25.1 SCHIZOAFFECTIVE DISORDER, DEPRESSIVE TYPE (H): Primary | ICD-10-CM

## 2025-02-13 ENCOUNTER — HOSPITAL ENCOUNTER (EMERGENCY)
Facility: CLINIC | Age: 20
Discharge: HOME | End: 2025-02-13
Attending: EMERGENCY MEDICINE | Admitting: EMERGENCY MEDICINE
Payer: COMMERCIAL

## 2025-02-13 ENCOUNTER — MEDICAL CORRESPONDENCE (OUTPATIENT)
Dept: HEALTH INFORMATION MANAGEMENT | Facility: CLINIC | Age: 20
End: 2025-02-13

## 2025-02-13 VITALS
HEIGHT: 61 IN | OXYGEN SATURATION: 98 % | WEIGHT: 160.7 LBS | BODY MASS INDEX: 30.34 KG/M2 | RESPIRATION RATE: 18 BRPM | TEMPERATURE: 99.1 F | HEART RATE: 81 BPM | SYSTOLIC BLOOD PRESSURE: 117 MMHG | DIASTOLIC BLOOD PRESSURE: 73 MMHG

## 2025-02-13 DIAGNOSIS — R45.851 SUICIDAL IDEATION: ICD-10-CM

## 2025-02-13 PROBLEM — F41.9 ANXIETY: Status: ACTIVE | Noted: 2023-07-03

## 2025-02-13 PROCEDURE — 99283 EMERGENCY DEPT VISIT LOW MDM: CPT

## 2025-02-13 PROCEDURE — 99284 EMERGENCY DEPT VISIT MOD MDM: CPT | Performed by: NURSE PRACTITIONER

## 2025-02-13 ASSESSMENT — ACTIVITIES OF DAILY LIVING (ADL)
ADLS_ACUITY_SCORE: 41

## 2025-02-13 ASSESSMENT — COLUMBIA-SUICIDE SEVERITY RATING SCALE - C-SSRS
6. HAVE YOU EVER DONE ANYTHING, STARTED TO DO ANYTHING, OR PREPARED TO DO ANYTHING TO END YOUR LIFE?: YES
2. HAVE YOU ACTUALLY HAD ANY THOUGHTS OF KILLING YOURSELF IN THE PAST MONTH?: YES
4. HAVE YOU HAD THESE THOUGHTS AND HAD SOME INTENTION OF ACTING ON THEM?: NO
3. HAVE YOU BEEN THINKING ABOUT HOW YOU MIGHT KILL YOURSELF?: YES
1. IN THE PAST MONTH, HAVE YOU WISHED YOU WERE DEAD OR WISHED YOU COULD GO TO SLEEP AND NOT WAKE UP?: YES
5. HAVE YOU STARTED TO WORK OUT OR WORKED OUT THE DETAILS OF HOW TO KILL YOURSELF? DO YOU INTEND TO CARRY OUT THIS PLAN?: NO

## 2025-02-13 NOTE — ED TRIAGE NOTES
Pt presents via EMS from school for eval suicidal ideation. Pt asked teacher to talk and admitted to staff that she was feeling suicidal. Mother called called, requested to be transported to ED.     Triage Assessment (Adult)       Row Name 02/13/25 1245          Triage Assessment    Airway WDL WDL        Cognitive/Neuro/Behavioral WDL    Cognitive/Neuro/Behavioral WDL mood/behavior     Mood/Behavior sad;cooperative
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
## 2025-02-13 NOTE — ED PROVIDER NOTES
"  Emergency Department Note      History of Present Illness     Chief Complaint   Suicidal      HPI   Lauren Montenegro is a 19 year old adult with a history of depression and schizoaffective disorder presenting to the ED with suicidal ideation. She states that she developed suicidal ideation this morning as a school lock down drill made her feel anxious and worsened her paranoia of her oral loved ones dying.  . She denies any suicide plan. Denies hallucinations or homicidal ideation. She states she is feeling more tired than usual. She states that she has felt suicidal like this in the past and has seen in [facility] previously. She has a psychiatrist she regularly sees. No recent medication changes. She adds that she has recently suffered rhinorrhea and sore throat though, noting family members at home as well as schoolmates have displayed similar symptoms. She endorses THC use.     Independent Historian   None    Review of External Notes   none    Past Medical History     Medical History and Problem List   ADHD  Anxiety  Auditory processing disorder  Depression  Schizoaffective disorder    Medications   Acetylcysteine  Abilify  Strattera  Lexapro  Nexplanon  Flonase  Glucophage  Narcan  Imitrex    Surgical History   Abdomen surgery  Tonsillectomy and adenoidectomy     Physical Exam     Patient Vitals for the past 24 hrs:   BP Temp Temp src Pulse Resp SpO2 Height Weight   02/13/25 1412 117/73 99.1  F (37.3  C) Oral 81 18 98 % 1.549 m (5' 1\") 72.9 kg (160 lb 11.2 oz)   02/13/25 1259 108/65 97.7  F (36.5  C) Temporal 64 18 99 % -- --     Physical Exam  Physical Exam   Constitutional:  Patient is oriented to person, place, and time. They appear well-developed and well-nourished. Mild distress secondary to suicidal ideation   HENT:   Mouth/Throat:   Oropharynx is clear and moist.   Eyes:    Conjunctivae normal and EOM are normal. Pupils are equal, round, and reactive to light.   Neck:    Normal range of motion. "   Cardiovascular: Normal rate, regular rhythm and normal heart sounds.  Exam reveals no gallop and no friction rub.  No murmur heard.  Pulmonary/Chest:  Effort normal and breath sounds normal. Patient has no wheezes. Patient has no rales.   Musculoskeletal:  Normal range of motion. Patient exhibits no edema.   Neurological:   Patient is alert and oriented to person, place, and time. Patient has normal strength. No cranial nerve deficit or sensory deficit. GCS 15  Skin:   Skin is warm and dry. No rash noted. No erythema.   Psychiatric:   Suicidal without plan. Not homicidal. General paranoia.  Diagnostics     Lab Results   Labs Ordered and Resulted from Time of ED Arrival to Time of ED Departure - No data to display    Imaging   No orders to display     Independent Interpretation   None    ED Course      Medications Administered   Medications - No data to display    Procedures   Procedures     Discussion of Management   None    ED Course   ED Course as of 02/13/25 1431   Thu Feb 13, 2025   1305 I obtained history and examined the patient as noted above.     Additional Documentation  None    Medical Decision Making / Diagnosis     CMS Diagnoses: None    MIPS       None    [facility]   Jacqueline Montenegro is a 19 year old adult who presents to the emergency department with suicidal ideation but without a specific plan and worsening paranoia.  Medically she is clear.  She has had cold symptoms recently but her physical exam is otherwise reassuringly normal.  She did not self-harm today.  Patient is aware of [facility] has been there previously with a successful treatment plan.  She is voluntary and will go there today.    Disposition   The patient was transferred to [facility].     Diagnosis     ICD-10-CM    1. Suicidal ideation  R45.851     paranoia           Discharge Medications   New Prescriptions    No medications on file     Scribe Disclosure:  I, AMARILYS MILES, am serving as a scribe at 1:39 PM on 2/13/2025 to document services  personally performed by Moon Rosario MD based on my observations and the provider's statements to me.      Moon Rosario MD  02/13/25 0405

## 2025-02-13 NOTE — ED NOTES
"[facility]  ED to [facility] Checklist:      Goal for [facility]: Suicidality    Current Behavior: Sad and Cooperative    Safety Concerns: Suicidal, with a plan to \"jump\"    Legal Hold Status: Voluntary    Medically Cleared by ED provider: Yes    Patient Therapeutically Searched: Not searched - Currently in triage    Belongings: Remain with patient    Independent Ambulation at Baseline: Yes/No: Yes    Participates in Care/Conversation: Yes/No: Yes    Patient Informed about E[facility]: Yes/No: Yes    DEC: Not ordered    Patient Ready to be Transferred to [facility]? Yes/No: Yes when med yovani completed      "

## 2025-02-13 NOTE — ED NOTES
Pt has sore throat and Rhinorrhea,this writer asking for Covid,Strep and influenza before coming to [facility].

## 2025-02-14 ENCOUNTER — OFFICE VISIT (OUTPATIENT)
Dept: PSYCHIATRY | Facility: CLINIC | Age: 20
End: 2025-02-14
Payer: COMMERCIAL

## 2025-02-14 DIAGNOSIS — F17.200 TOBACCO USE DISORDER: ICD-10-CM

## 2025-02-14 DIAGNOSIS — F12.90 CANNABIS USE DISORDER: ICD-10-CM

## 2025-02-14 DIAGNOSIS — F90.2 ADHD (ATTENTION DEFICIT HYPERACTIVITY DISORDER), COMBINED TYPE: ICD-10-CM

## 2025-02-14 DIAGNOSIS — F41.1 GENERALIZED ANXIETY DISORDER: ICD-10-CM

## 2025-02-14 DIAGNOSIS — F25.1 SCHIZOAFFECTIVE DISORDER, DEPRESSIVE TYPE (H): Primary | ICD-10-CM

## 2025-02-14 PROCEDURE — 90785 PSYTX COMPLEX INTERACTIVE: CPT | Performed by: PSYCHOLOGIST

## 2025-02-14 PROCEDURE — 90834 PSYTX W PT 45 MINUTES: CPT | Performed by: PSYCHOLOGIST

## 2025-02-14 NOTE — PLAN OF CARE
Lauren Montenegro  February 13, 2025  Plan of Care Hand-off Note     Patient Recommended Care Path: discharge    Clinical Substantiation:  Pt presented to the ED via EMS after expressing increased anxiety and SI. While in the ED, Pt has been able to return to baseline and is no longer endorse SI or any imminent safety concerns. Pt is already well supported with outpatient therapist and DBT therapist, meeting weekly, as well as psychiatric medication management. Pt is future-oriented, help-seeking, and goal directed; Pt is able to engage in safety planning appropriately and able to discharge to a less restrictive environment.    Goals for crisis stabilization:  Stabilize Anxiety and SI    Next steps for Care Team:  Process recent stressors that triggered increase SI    Treatment Objectives Addressed:  rapport building, orienting the patient to therapy, processing feelings, safety planning, assessing safety    Therapeutic Interventions:  Engaged in cognitive restructuring/ reframing, looked at common cognitive distortions and challenged negative thoughts., Engaged in guided discovery, explored patient's perspectives and helped expand them through socratic dialogue., Engaged in exposure therapy, having patient look at fears/fear hierarchy and utilize coping skills to face exposures.    Has a specific means been identified for suicidal.homicide actions: No  If yes, describe:  NA  Explain action steps toward mitigation:  NA  Document completion of mitigation action:  NA  The follow up action still needed prior to discharge:  NA    Patient coping skills attempted to reduce the crisis:  DBT skills, talked with school staff and mom; willingly came to ED today      Collateral contact information:  Mandie Montenegro, Mom/LG ([phone])    Legal Status: Guardian/ad litum                            Reviewed court records: yes     Psychiatry Consult: NADEEM ARORA

## 2025-02-14 NOTE — CONSULTS
Diagnostic Evaluation Consultation  Crisis Assessment    Patient Name: Lauren Montenegro  Age:  19 year old  Legal Sex: female  Gender Identity: choose not to disclose  Pronouns: she/her/hers, they/them/theirs  Race: White  Ethnicity:  or   Language: English      Patient was assessed: In person   Crisis Assessment Start Date: 02/13/25  Crisis Assessment Start Time: 1720  Crisis Assessment Stop Time: 1738  Patient location: [facility] Emergency Dept                             [facility]    Referral Data and Chief Complaint  Lauren Montenegro presents to the ED via EMS. Patient is presenting to the ED for the following concerns: Suicidal ideation. Factors that make the mental health crisis life threatening or complex are: Pt presents to the ED today via EMS, coming from their school, due to experiencing increased SI. Pt identifies stressors as having a school lockdown drill today, which created increased anxiety and paranoia. Pt started crying during school, and a peer called Pt out for this, intensifying Pt's anxiety and paranoia. School staff talked with Pt, completed a mini-assessment, which Pt scored high for having SI. School staff talked with Pt's mom, as well as Pt's mom talked with Pt, and they agreed Pt would benefit from being assessed in the ED. While on [facility], Pt was able to stabilize, and denied any current SI, NSSIB, or HI; Pt denies anhedonia, appetite and sleep changes, increased depression or anxiety, or any AH/VH. Pt describes today as being an 'odd day' but overall reports feeling mental health has been stable and is well supported by outpatient services.    Informed Consent and Assessment Methods  Explained the crisis assessment process, including applicable information disclosures and limits to confidentiality, assessed understanding of the process, and obtained consent to proceed with the assessment.  Assessment methods included conducting a formal interview with  patient, review of medical records, collaboration with medical staff, and obtaining relevant collateral information from family and community providers when available.  : done     History of the Crisis   Pt is a 19 year old, currently living with parents, and is under legal guardianship of parents (Mandie and Jamshid Montenegro). Pt has previously been diagnosed with depression, anxiety, and schizoaffective, depressive type. Pt reports multiple past IPMH and previous suicide attempts, with most recent attemp via hanging, and most recent IPMH about 2-3 years ago. Pt reports having weekly individual therapy and weekly DBT therapy; Pt is followed by psychiatry as well, and reports all services are through [facility]. Pt is able to identify protective factors, including being future-oriented and goal-directed, having several supports, and being able to enjoy hobbies and activities.    Brief Psychosocial History  Family:  Single, Children no  Support System:  Parent(s), Friend  Employment Status:  student, employment seeking  Source of Income:  other (see comments) (parental support)  Financial Environmental Concerns:  none  Current Hobbies:  arts/crafts, music, group/social activities  Barriers in Personal Life:  mental health concerns    Significant Clinical History  Current Anxiety Symptoms:  anxious, excessive worry (at time of lockdown drill at school; denies currently)  Current Depression/Trauma:  thoughts of death/suicide (earlier today; denies current increase in depressive symptoms)  Current Somatic Symptoms:  anxious  Current Psychosis/Thought Disturbance:   (denies)  Current Eating Symptoms:   (denies)  Chemical Use History:  Alcohol: None  Benzodiazepines: None  Opiates: None  Cocaine: None  Marijuana: Other (comments) (social)  Other Use: None  Withdrawal Symptoms:  (NA)  Addictions:  (NA)   Past diagnosis:  Anxiety Disorder, Depression, Suicide attempt(s) (schizoaffective disorder, depressive  type)  Family history:  No known history of mental health or chemical health concerns  Past treatment:  Individual therapy, Family therapy, Case management, Psychiatric Medication Management, Primary Care, Partial Hospitalization, Inpatient Hospitalization, AA/NA  Details of most recent treatment:  Currently works with DBT and OPIT through [facility] on weekly basis; psychiatry also through [facility]  Other relevant history:  previous IPMH and [facility] treatment; medical records indicates positive experience previously with NA    Have there been any medication changes in the past two weeks:  no       Is the patient compliant with medications:  yes        Collateral Information  Is there collateral information: Yes     Collateral information name, relationship, phone number:  Mandie Montenegro, Mom/LG ([phone])    What happened today: Today, while at school, there was a lockdown drill, that caused her to feel nervous and paranoid. She was crying in class, and a girl called her out. The school called me, because after doing an assessment, she scored high for having suicidal thoughts. I talked with her, and she was not feeling good mentally. She really didn't want to, but she did agree to go to the ED to get assessed. Feel proud of her for verbalizing she was struggling     What is different about patient's functioning: She's been more periodically down the past few weeks. She is also smoking marijuana regularly, and doesn't seem to get how that can affect her mental health and paranoia. Also, the last two weeks, her therapists have been out, so she's had less supports than she normally does     Has patient made comments about wanting to kill themselves/others: yes    If d/c is recommended, can they take part in safety/aftercare planning:  yes    Additional collateral information:  really proud she was able to ask for help. Don't want her to see this as a punishment, but just part of taking care of her mental health.      Risk Assessment  [place] Suicide Severity Rating Scale Full Clinical Version:  Suicidal Ideation  Q6 Suicide Behavior (Lifetime): yes       [place] Suicide Severity Rating Scale Recent:   Suicidal Ideation (Recent)  Q1 Wished to be Dead (Past Month): yes  Q2 Suicidal Thoughts (Past Month): yes  Q3 Suicidal Thought Method: yes  Q4 Suicidal Intent without Specific Plan: no  Q5 Suicide Intent with Specific Plan: no  If yes to Q6, within past 3 months?: no  Level of Risk per Screen: moderate risk  Intensity of Ideation (Recent)  Most Severe Ideation Rating (Past 1 Month):  (does not rate)  Description of Most Severe Ideation (Past 1 Month): describes experiencing heightened SI today due to stressors; denies current SI  Frequency (Past 1 Month):  (does not rate)  Duration (Past 1 Month):  (does not rate)  Controllability (Past 1 Month):  (does not rate)  Deterrents (Past 1 Month):  (does not rate)  Reasons for Ideation (Past 1 Month):  (does not rate)  Suicidal Behavior (Recent)  Actual Attempt (Past 3 Months): No  Total Number of Actual Attempts (Past 3 Months): 0  Actual Attempt Description (Past 3 Months): NA  Has subject engaged in non-suicidal self-injurious behavior? (Past 3 Months): No  Interrupted Attempts (Past 3 Months): No  Total Number of Interrupted Attempts (Past 3 Months): 0  Interrupted Attempt Description (Past 3 Months): NA  Aborted or Self-Interrupted Attempt (Past 3 Months): No  Total Number of Aborted or Self-Interrupted Attempts (Past 3 Months): 0  Aborted or Self-Interrupted Attempt Description (Past 3 Months): NA  Preparatory Acts or Behavior (Past 3 Months): No  Total Number of Preparatory Acts (Past 3 Months): 0  Preparatory Acts or Behavior Description (Past 3 Months): NA    Environmental or Psychosocial Events:  (denies current significant stressors other than events from today)  Protective Factors: Protective Factors: strong bond to family unit, community support, or employment, lives in  a responsibly safe and stable environment, able to access care without barriers, help seeking, good impulse control, sense of importance of health and wellness, responsibilities and duties to others, including pets and children, good treatment engagement, supportive ongoing medical and mental health care relationships, sense of belonging, sense of self-efficacy and/or positive self-esteem, optimistic outlook - identification of future goals, reality testing ability, constructive use of leisure time, enjoyable activities, resilience    Does the patient have thoughts of harming others? Feels Like Hurting Others: no  Previous Attempt to Hurt Others: no  Current presentation:  (engaged, pleasant and calm)  Is the patient engaging in sexually inappropriate behavior?: no  Does Patient have a known history of aggressive behavior: No  Has aggression occurred as a result of MH concerns/diagnosis: No  Does patient have history of aggression in hospital: No    Is the patient engaging in sexually inappropriate behavior?  no        Mental Status Exam   Affect: Appropriate  Appearance: Appropriate  Attention Span/Concentration: Attentive  Eye Contact: Engaged    Fund of Knowledge: Appropriate   Language /Speech Content: Fluent  Language /Speech Volume: Normal  Language /Speech Rate/Productions: Normal  Recent Memory: Intact  Remote Memory: Intact  Mood: Normal  Orientation to Person: Yes   Orientation to Place: Yes  Orientation to Time of Day: Yes  Orientation to Date: Yes     Situation (Do they understand why they are here?): Yes  Psychomotor Behavior: Normal  Thought Content: Clear  Thought Form: Intact     Mini-Cog Assessment  NA     Medication  Psychotropic medications:   Medication Orders - Psychiatric (From admission, onward)      None             Current Care Team  Patient Care Team:  Caroline Moreno DNP as PCP - General (Geriatric Medicine)  Mely Portillo [facility] as Pharmacist (Pharmacist)  Landauer, Daniel, PhD as  Assigned Behavioral Health Provider  Kay Schneider LSW as Specialty Care Coordinator ( - Clinical)  Rosa Govea MD as Resident (Psychiatry)  Evy Krueger MD as MD (Psychiatry)  Mary Dawn PA-C as Physician Assistant (OB/Gyn)  Caroline Moreno DNP as Assigned PCP  Mary Dawn PA-C as Assigned OBGYN Provider    Diagnosis  Patient Active Problem List   Diagnosis Code    Suicidal ideation R45.851    Schizoaffective disorder, bipolar type (H) F25.0    ADHD (attention deficit hyperactivity disorder), combined type F90.2    Anxiety F41.9    Cannabis use disorder F12.90    Suicide ideation R45.851       Primary Problem This Admission  Active Hospital Problems    Anxiety        Clinical Summary and Substantiation of Recommendations   Clinical Substantiation:  Pt presented to the ED via EMS after expressing increased anxiety and SI. While in the ED, Pt has been able to return to baseline and is no longer endorse SI or any imminent safety concerns. Pt is already well supported with outpatient therapist and DBT therapist, meeting weekly, as well as psychiatric medication management. Pt is future-oriented, help-seeking, and goal directed; Pt is able to engage in safety planning appropriately and able to discharge to a less restrictive environment.    Goals for crisis stabilization:  Stabilize Anxiety and SI    Next steps for Care Team:  Process recent stressors that triggered increase SI    Treatment Objectives Addressed:  rapport building, orienting the patient to therapy, processing feelings, safety planning, assessing safety    Therapeutic Interventions:  Engaged in cognitive restructuring/ reframing, looked at common cognitive distortions and challenged negative thoughts., Engaged in guided discovery, explored patient's perspectives and helped expand them through socratic dialogue., Engaged in exposure therapy, having patient look at fears/fear hierarchy and utilize coping skills to  face exposures.    Has a specific means been identified for suicidal/homicide actions: No    If yes, describe:   NA    Explain action steps toward mitigation:   NA    Document completion of mitigation actions:   NA    The follow up action still needed prior to discharge:   NA    Patient coping skills attempted to reduce the crisis:  DBT skills, talked with school staff and mom; willingly came to ED today    Disposition  Recommended referrals: Other. please comment (None needed; Pt established and well supported already)        Reviewed case and recommendations with attending provider. Attending Name: Patt Parker CNP, APRN, agrees with recommendation for discharge       Attending concurs with disposition: yes       Patient and/or validated legal guardian concurs with disposition:   yes       Final disposition:  discharge      Legal status: Guardian/ad litum  Reviewed court records: yes       Assessment Details   Total duration spent with the patient: 18 min     CPT code(s) utilized: 22508 - Psychotherapy (with patient) - 30 (16-37*) min    NADEEM ADEN, Psychotherapist  [facility] - Triage & Transition Services  Callback: [phone]

## 2025-02-14 NOTE — PROGRESS NOTES
Patient agreeable to discharge plan. Discharge instructions reviewed with patient including follow-up care plan. Medications: reviewed. Reviewed safety plan and outpatient resources. Denies SI and HI. All belongings that were brought into the hospital have been returned to patient. Escorted off the unit at 1838 accompanied by [facility] staff. Discharged to home via private transportation with father.

## 2025-02-14 NOTE — ED PROVIDER NOTES
[facility] Unit - Psychiatric Consultation  [facility] Emergency Department    Lauren Montenegro MRN: [id number]   Age: 19 year old YOB: 2005     Telemedicine Visit: The patient's condition can be safely assessed and treated via synchronous audio and visual telemedicine encounter.      Reason for Telemedicine Visit: Services only offered telehealth      Originating Site (Patient Location): [facility] emergency department unit    Distant Site (Provider Location): Provider Remote Home Office Setting    Consent:  The patient/guardian has verbally consented to: the potential risks and benefits of telemedicine (video visit or phone) versus in person care; bill my insurance or make self-payment for services provided; and responsibility for payment of non-covered services.     Mode of Communication: [employer], a secure HIPAA compliant video platform      Start time:  1747  End time:   1800   History     Chief Complaint   Patient presents with    Suicidal     HPI  Lauren Montenegro is a 19 year old adult with history notable for a history of schizoaffective disorder who presented to the ED with suicide ideation after school lock-down drill caused anxiety and worry about her loved ones. She was cleared medically and transferred to [facility] for additional assessment and treatment planning. At the time of the interview today, 2/13/25 she is nearing 5.5 hours in emergency care.    Please see the [facility] assessment & reassessment (if available), ED physician notes and nursing notes for additional information and collateral content if available. All were reviewed prior to meeting with the patient. Pertinent content includes the following: Pt forgot to take her medication last night. She endorses THC use.    Lauren is interviewed while she is seated in a conference room. She is engaged throughout the interview and appears to be a reliable informant. She reports that since she arrived in [facility], the SI and anxiety have  dissipated. She currently denies SI/HI, A/V hallucinations, delusions or paranoia. She is looking forward to events this weekend surrounding Cassandra's Day. She has therapy and psychiatry in place in the community and would like to discharge home with her parents.       Past Medical History  Past Medical History:   Diagnosis Date    ADHD (attention deficit hyperactivity disorder)     Anxiety     Auditory processing disorder     Depression     Schizoaffective disorder (H)      Past Surgical History:   Procedure Laterality Date    ABDOMEN SURGERY      appendix    TONSILLECTOMY, ADENOIDECTOMY, COMBINED       acetylcysteine (N-ACETYL CYSTEINE) 600 MG CAPS capsule  ARIPiprazole (ABILIFY) 10 MG tablet  atomoxetine (STRATTERA) 40 MG capsule  escitalopram (LEXAPRO) 20 MG tablet  fluticasone (FLONASE) 50 MCG/ACT nasal spray  ibuprofen (ADVIL/MOTRIN) 800 MG tablet  metFORMIN (GLUCOPHAGE XR) 500 MG 24 hr tablet  etonogestrel (NEXPLANON) 68 MG IMPL  naloxone (NARCAN) 4 MG/0.1ML nasal spray  norgestimate-ethinyl estradiol (ORTHO-CYCLEN) 0.25-35 MG-MCG tablet  SUMAtriptan (IMITREX) 25 MG tablet  triamcinolone (KENALOG) 0.025 % cream      No Known Allergies  Family History  Family History   Adopted: Yes   Problem Relation Age of Onset    No Known Problems Mother     No Known Problems Father      Social History   Social History     Tobacco Use    Smoking status: Every Day     Types: Vaping Device     Passive exposure: Never    Smokeless tobacco: Never    Tobacco comments:     Vapes nicotine and marijuana daily. No cigarettes   Vaping Use    Vaping status: Every Day    Substances: Nicotine   Substance Use Topics    Alcohol use: Not Currently    Drug use: Yes     Types: Marijuana          Review of Systems  A medically appropriate review of systems was performed with pertinent positives and negatives noted in the HPI, and all other systems negative.    Physical Examination   BP: 108/65  Pulse: 64  Temp: 97.7  F (36.5  C)  Resp:  "18  Height: 154.9 cm (5' 1\")  Weight: 72.9 kg (160 lb 11.2 oz)  SpO2: 99 %    Physical Exam  General: Appears stated age.   Neuro: Alert and fully oriented. Extremities appear to demonstrate normal strength on visual inspection.   Integumentary/Skin: no rash visualized, normal color    Psychiatric Examination   Appearance: awake, alert  Attitude:  cooperative  Eye Contact:  good  Mood:  better  Affect:  appropriate and in normal range  Speech:  clear, coherent  Psychomotor Behavior:  no evidence of tardive dyskinesia, dystonia, or tics  Thought Process:  linear and goal oriented  Associations:  no loose associations  Thought Content:  no evidence of suicidal ideation or homicidal ideation and no evidence of psychotic thought  Insight:  good  Judgement:  intact  Oriented to:  time, person, and place  Attention Span and Concentration:  intact  Recent and Remote Memory:  intact  Language: able to name/identify objects without impairment  Fund of Knowledge: intact with awareness of current and past events    ED Course     ED Course as of 02/13/25 1801   Thu Feb 13, 2025   1305 I obtained history and examined the patient as noted above.       Labs Ordered and Resulted from Time of ED Arrival to Time of ED Departure - No data to display    Assessments & Plan (with Medical Decision Making)   Patient presenting with emotional dysregulation following a school lock-down drill that caused an increase in anxiety with suicidal ideation. She missed a dose of medication last night. Now, while she has had time to reflect and the SI has dissipated and she now reports and demonstrates emotional regulation. There are no imminent safety concerns. Together, through a shared decision-making process, a plan of care was developed as is noted below.  Nursing notes reviewed noting no acute issues.     I have reviewed the assessment completed by the [facility].     Preliminary diagnosis:    ICD-10-CM    1. Suicidal ideation  R45.851     paranoia "     2.      Schizoaffective Disorder, bipolar type (H)      F25.0        Treatment Plan:  - Resume prior to admission medications.  - Follow up with appointments as scheduled.  - Encouraged to refrain from cannabis use, but if using cannabis, encouraged to limit to lower dose THC combine with CBD. Discussed the implications of higher dose THC and risk for psychosis.  - Discharge home, return if symptoms worsen.    --  DEBORAH James CNP   [facility] EMERGENCY DEPT  [facility] Unit      Patt Parker APRN CNP  02/17/25 0159

## 2025-02-17 ENCOUNTER — APPOINTMENT (OUTPATIENT)
Dept: PSYCHIATRY | Facility: CLINIC | Age: 20
End: 2025-02-17
Attending: PSYCHOLOGIST
Payer: COMMERCIAL

## 2025-02-17 DIAGNOSIS — F25.1 SCHIZOAFFECTIVE DISORDER, DEPRESSIVE TYPE (H): Primary | ICD-10-CM

## 2025-02-19 ENCOUNTER — OFFICE VISIT (OUTPATIENT)
Dept: URGENT CARE | Facility: URGENT CARE | Age: 20
End: 2025-02-19
Payer: COMMERCIAL

## 2025-02-19 ENCOUNTER — VIRTUAL VISIT (OUTPATIENT)
Dept: PSYCHIATRY | Facility: CLINIC | Age: 20
End: 2025-02-19
Attending: PSYCHOLOGIST
Payer: COMMERCIAL

## 2025-02-19 VITALS
BODY MASS INDEX: 31.2 KG/M2 | HEART RATE: 100 BPM | SYSTOLIC BLOOD PRESSURE: 105 MMHG | WEIGHT: 165.1 LBS | RESPIRATION RATE: 19 BRPM | TEMPERATURE: 99.1 F | OXYGEN SATURATION: 97 % | DIASTOLIC BLOOD PRESSURE: 71 MMHG

## 2025-02-19 DIAGNOSIS — F25.1 SCHIZOAFFECTIVE DISORDER, DEPRESSIVE TYPE (H): Primary | ICD-10-CM

## 2025-02-19 DIAGNOSIS — K59.09 OTHER CONSTIPATION: Primary | ICD-10-CM

## 2025-02-19 PROCEDURE — 99207 PR NON-BILLABLE SERV PER CHARTING: CPT | Mod: 95

## 2025-02-19 PROCEDURE — 99213 OFFICE O/P EST LOW 20 MIN: CPT | Performed by: NURSE PRACTITIONER

## 2025-02-19 RX ORDER — POLYETHYLENE GLYCOL 3350 17 G/17G
1 POWDER, FOR SOLUTION ORAL DAILY
Qty: 510 G | Refills: 0 | Status: SHIPPED | OUTPATIENT
Start: 2025-02-19

## 2025-02-19 NOTE — PATIENT INSTRUCTIONS
"Start miralax daily.   Non-constipating diet discussed.    Increase fiber and fluid and decrease milk and cheese intake.     Warm baths, belly massage, motion of bicycling legs.    Eat \"P\" fruits - pear, prune, pineapple, papaya, peach can help soften stool.    Drink plenty of fluids.     Call with any questions or concerns.      "

## 2025-02-19 NOTE — PROGRESS NOTES
"Assessment & Plan     Other constipation  - polyethylene glycol (MIRALAX) 17 GM/Dose powder  Dispense: 510 g; Refill: 0     Patient Instructions   Start miralax daily.   Non-constipating diet discussed.    Increase fiber and fluid and decrease milk and cheese intake.     Warm baths, belly massage, motion of bicycling legs.    Eat \"P\" fruits - pear, prune, pineapple, papaya, peach can help soften stool.    Drink plenty of fluids.     Call with any questions or concerns.        Return in about 1 week (around 2/26/2025) for with regular provider if symptoms persist.    DEBORAH Ayala CNP  [facility] URGENT CARE ALIS Phillips is a 19 year old adult who presents to clinic today for the following health issues:  Chief Complaint   Patient presents with    Urgent Care     Patient presents with small hard stools since Monday and has developed rectal bleeding and painful when straining also abdominal discomfort.     HPI    Abdominal Pain    Location: suprapubic and LLQ   Radiation: None.    Pain character: pressure, sharp, and \"pain\",   Severity: 4 on a scale of 1-10.    Duration: 3 day(s)   Course of Illness: slowly progressive.  Exacerbated by: bowel movement  Relieved by: nothing.  Associated Symptoms: constipation.  Female : Not applicable  Surgical History: none      Review of Systems  Constitutional, HEENT, cardiovascular, pulmonary, GI, , musculoskeletal, neuro, skin, endocrine and psych systems are negative, except as otherwise noted.      Objective    /71   Pulse 100   Temp 99.1  F (37.3  C) (Tympanic)   Resp 19   Wt 74.9 kg (165 lb 1.6 oz)   SpO2 97%   BMI 31.20 kg/m    Physical Exam   GENERAL: alert and no distress  RESP: lungs clear to auscultation - no rales, rhonchi or wheezes  CV: regular rate and rhythm, normal S1 S2, no S3 or S4, no murmur, click or rub, no peripheral edema  ABDOMEN: tenderness LLQ and lower midline and bowel sounds normal  MS: no gross " musculoskeletal defects noted, no edema

## 2025-02-20 NOTE — PROGRESS NOTES
[facility]  Psychiatry Clinic  Psychological Testing by Psychometrist     Lauren Montenegro MRN# [id number]   Age: 19 year old YOB: 2005     Date:  2/19/25    Video- Visit Details   Type of service:  video visit  Video start time: 12:00 pm  Video end time:  12:21 pm  Originating location (patient location):  [facility]   Location- Home  Distant Site (provider location): HIPAA compliant location Off-site  Platform used for video visit:  Secure real time interactive audio and visual telecommunication system via [employer]     Attendees: Patient attended the session alone  : No, English    Identifying Information/Reason for Referral  Lauren Montenegro is a 19 year old adult who prefers the name Jacqueline & pronouns she.  Jacqueline has a history of Schizoaffective disorder .  The patient was seen for psychological testing. The purpose of the current psychological evaluation was to provide information about Jacqueline's social, emotional and cognitive functioning, to assist with diagnostic clarification and to guide her treatment and recovery planning. Results of the following assessments are to be used in conjunction with the standard diagnostic evaluation.    A total of 21 minutes were spent in test administration and scoring by this writer, BRIT MONROY psychometriskana.  See Testing Evaluation Report on future date for a full interpretation of the findings and data.     Summary of Services/Procedures  Jacqueline was administered a psychological test battery tailored to her age and the referral questions.     Tests Administered  Finished self-report questions    Behavioral Observations  (NOTE: Additional behavioral observations and summary statement regarding validity of testing completed. Delete out grey italics before closing note).  Overall, Jacqueline demonstrated good effort throughout testing. Therefore, the current evaluation results are thought to provide a accurate  representation of her functioning in the areas assessed.     Plan  (NOTE: Delete out grey italics before closing note. If more testing is scheduled, use this:)  Testing is NOT complete. Patient is scheduled to return to complete additional testing on February / 24 / 2025 .    [If completed] The patient and their invited support system will participate in a feedback appointment on a future date with their clinician.     Will coordinate care with other treatment providers to assist with planning as needed.      BRIT MONROY  Psychometrist      Billing for this encounter (CPT 60381, 95932) will occur on a later date when a qualified health professional reviews the findings with the patient in a psychological evaluation appointment (CPT 92092, 25231).

## 2025-02-21 ENCOUNTER — OFFICE VISIT (OUTPATIENT)
Dept: PSYCHIATRY | Facility: CLINIC | Age: 20
End: 2025-02-21
Attending: SOCIAL WORKER
Payer: COMMERCIAL

## 2025-02-22 NOTE — PROGRESS NOTES
JACQUELINE GRANDA (MARIE)    BD. 2005  DX. Schizoaffective disorder, bipolar type;   ADHD  CODE. 91479 FAMILY THERAPY WITHOUT PATIENT; IN PERSON  START TIME. 9.45AM  END TIME. 11.15 AM  SEEN BY SHARLA BASILIO, PHD     Jacqueline is a soon to be 20 year old adopted at 6 months from [place]; she has a 24 year old also adopted brother Micah. Parents describe her history: she seemed extremely active, restless even in early childhood; by age 12 she became increasingly distressed and difficult. She declined hearing voices as far back as age 4, as well as what appears to be visual hallucinations; father notes that she assumed these were normal, and never told them about these until later in adolescence. They were aware of learning difficulties (dyslexia, dycalculia, maybe dysgraphia in addition to ADHD) and focused more on these with in school and home remediation.    Work with parents focused on 1)collecting the history, including all the providers they have seen; 2) understanding [place]  needs in the present (since the past weighs heavily in their reporting; 3)appreciating their experiences: both parents endorsed trauma-like feelings as they described [place]  continual suicidality/ father reports that he is interrupted almost daily with calls for help.     Impressions and plan. Parents, especially mother, is consumed with details/ examples of [place]  difficulties. They have worked very hard to provide for her needs, and for reasons they cannot explain, have needed to do this alone when programs (specifically [facility] in [place]) refuse care. They feel supported by [facility] and believe Jacqueline was especially helped by DBT/ ask why she cannot continue in group? But what seems most salient is how reluctant they seemed to identifying how ill Jacqueline is. They repeatedly described her as  different  and specifically said they didn t want to think of her as  broken  (their word) even as they described her continued  vulnerabilities.     I gently suggested that maybe they did feel she was broken and father offered this analogy: we are watching other young adults riding their bikes of long distances and then seeing our children (Jacqueline and Micah) trying to keep up but their bikes have flat tires and chains that keep falling off. They also realized they they cannot be the bikes, but they are people who help Jacqueline and Micah learn to fill up their tires. For Jacqueline: since medication compliance is challenging for Jacqueline, suggest parents put their energies into this. Concern about persistent suicidality is significant so reduce demands and increase cooperation. Parents  own anxiety is very significant although they are both able to maintain very successful careers. Parent work seems indicated, since Jacqueline continues to be in crisis: to coordinate care, to help them with their own feelings of trauma, and to  them in relating to Jacqueline in ways that may reduce expectations and support her balance and regulation. Plan will be to meet with parents again; they reports a family meeting would be extremely hard for Jacqueline to focus so we will make that an eventual goal. I will also collaborate with [place]  care team.     Renata Rivas, PhD

## 2025-02-23 NOTE — PROGRESS NOTES
" owns   [facility]  Psychosis Treatment Team  MEDICAL PROGRESS NOTE       CARE TEAM:    PCP: Caroline Moreno  Therapist: Prisca Stallings - [facility]  (Aly Bell)- Leela Phillips is a 19 year old who uses the pronouns she, her, hers, they, them, theirs.     Virtual Visit Details    Type of service:  Video Visit   START:347  STOP:411   Originating Location (pt. 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    Today, Jacqueline reports recently applying for a job independently.  She believes medication adherence is somewhat improved and both she and dad agree that she feels better when taking medications regularly, and regimen is overall optimized.  She is transitioning to have a new PCA, previous PCA was helpful.  While she reports good mood, she does continue to have intermittent SI that is worse during the week and improved on the weekend, this patient did not therapy for that as well as that she is trying to save face, actively having happened in the beginning " systems as a sleep, I started her ability differently because the appointment is not [place] they have been yesterday but denies current safety concerns.  Dad reports that sleep schedule is impacted by different schedule on the weekends, does think this impacts her ability to stay present at school.  Jacqueline reports a preference to lose weight.  Discussed option to change metformin versus eventual referral to weight management.  At this time they would like to hold off on any changes.    Psychotropic Drug Interactions:    ADDITIVE SEROTONERGIC: Abilify, escitalopram  ADDITIVE QTc: Escitalopram, atomoxetine   Management: routine monitoring    [place] was not [place] today: not using controlled substances    Risk Statements:   Treatment Risk: Risks, benefits, alternatives and potential adverse effects have been discussed and are understood.   Safety Risk: [place] did not appear to be an imminent safety risk to self or others.  Future Considerations:  -Continue coordinating care with parents as needed   -Optimizing ADHD medication as needed (atomoxetine)  -Avoid stimulants due to potential for worsening psychosis   -Monitoring Abilify effectiveness as well as long-term use/metabolic side effects.  -Consider referral to weight management clinic if needed   -Once a period of sustained sobriety is achieved, consider obtaining neuropsychological testing to better understand her functioning as well as continue to support her with appropriate resources   -Continue offering/exploring resources for substance use/relapse prevention       PLAN    1) Medications: no changes to regimen  - Continue atomoxetine 40 mg PO qDay  - Continue escitalopram 20 mg po qDay  - Continue aripiprazole 10 mg PO daily at bedtime (had been taking in the AM)   - Continue metformin  mg qAM (~7:30 am) and 1000 mg (2 tabs) with dinner (8 pm) for a total daily dose of 1500 mg)  -continue NAC supplement up to 1200mg BID     Other:   -light box to promote  wakefulness/support mood  -Continue melatonin 1-3 mg PO at bedtime PRN.      2) Psychotherapy: Individual therapy with Prisca Stallings, individual DBT with Dr. Kelton pathak    3) Next due:  Labs- Yearly AP labs-updated CBC, CMP, A1C and fasting lipids on 07/18/24  EKG- Routine monitoring is not indicated for current psychotropic medication regimen   Rating scales- AIMS:last score = 0. Next due December 2024. PHQ9, GAD7     4) Referrals: None    5) Follow-up: Return to clinic in 4 weeks                  Interval History   - Updates: things going well, job interview earlier this week at [employer]. School going well.  -Depression: dad notes pattern that she spends time with boyfriend on weekend but sleep pattern shifts, dad notices this effects ability to be at school   -SI: worse during week and better on weekend. Doesn't remember if has self-harmed.    -Sleep: not good, variable, getting 5-6 hours   -Appetite/eating: wants to eat less   -Psychosis: doesn't think so  -Substances: vaping cannabis daily still, nicotine, alcohol not often   -Therapy: Continues individual DBT and individual therapy  -Medications: Adherence is improved, no recent changes    PCA- in home services started for 3 weeks, just started with a new person. Thinks taking medications daily, PCA will be calling her morning and night to remind her. Will be giving her $15 to incentive brushing teeth/taking meds    Current Social History:  Financial/occupational: in school   Living situation: lives at home with parents  Social/spiritual support: family, friends, medical team      Pertinent Substance Use:  [Last updated 09/05/24]  Alcohol: Yes: intermittent   Cannabis: Yes: daily   Tobacco: Yes: once every other day vaping     Medical Review of Systems / Med sfx:     Was experiencing constipation, stomach issues, diarrhea    Contraception: Yes: getting nexplanon                 Summary Points of Current Care  09/2024: mood continues to be stable,  dicussed cannabis use with patient and parents and contraindication with hx of psychosis and mood   10/24: Due to daytime sedation, encouraged her to take shorter naps and regulate sleep cycle, also encouraged to abstain from cannabis  11/24: No medication changes, did request refill of OCP which we will bridge until establishes with new PCP  12/24: No medication changes, discussed strategies for improved adherence, will be getting Nexplanon  1/2025: No medication changes, recommended using [facility] and melatonin a few hours before intended bedtime to help with sleep hygiene  03/2025: no medication changes, consider future weight management referral                Physical Exam  (Vitals Only)    There were no vitals taken for this visit.  Pulse Readings from Last 3 Encounters:   02/19/25 100   02/13/25 81   01/29/25 100     Wt Readings from Last 3 Encounters:   02/19/25 74.9 kg (165 lb 1.6 oz) (90%, Z= 1.26)*   02/13/25 72.9 kg (160 lb 11.2 oz) (87%, Z= 1.15)*   01/29/25 75.3 kg (166 lb) (90%, Z= 1.28)*     * Growth percentiles are based on CDC (Girls, 2-20 Years) data.     BP Readings from Last 3 Encounters:   02/19/25 105/71   02/13/25 117/73   01/29/25 113/77                      Mental Status Exam    Alertness: oriented, drowsy, and sleepy  Appearance: casually groomed  Behavior/Demeanor: cooperative and calm, with good  eye contact   Speech: normal and regular rate and rhythm  Language: intact and no problems  Psychomotor: normal or unremarkable  Mood: description consistent with euthymia  Affect: restricted; congruent to: mood- yes, content- yes  Thought Process/Associations: unremarkable  Thought Content:  Reports none;  Denies suicidal ideation  Perception:  Reports none;  Denies hallucinations  Insight: good  Judgment: good  Cognition: does  appear grossly intact; formal cognitive testing was not done  Gait and Station: unremarkable                  Past Psychotropic Medication Trials           Medication Max Dose (mg) Dates / Duration Helpful? DC Reason / Adverse Effects?   sertraline           escitalopram 20         buspar           bupropion                       prazosin           hydroxyzine       Not needed/taken   adderall           Atomoxetine  25                     Abilify  10                        Past Medical History     Patient Active Problem List   Diagnosis    Suicidal ideation    Schizoaffective disorder, bipolar type (H)    ADHD (attention deficit hyperactivity disorder), combined type    Anxiety    Cannabis use disorder    Suicide ideation                     Medications     Current Outpatient Medications   Medication Sig Dispense Refill    acetylcysteine (N-ACETYL CYSTEINE) 600 MG CAPS capsule Take 2 capsules (1,200 mg) by mouth 2 times daily. 120 capsule 2    ARIPiprazole (ABILIFY) 10 MG tablet Take 1 tablet (10 mg) by mouth at bedtime. 30 tablet 2    atomoxetine (STRATTERA) 40 MG capsule Take 1 capsule (40 mg) by mouth daily. 30 capsule 2    escitalopram (LEXAPRO) 20 MG tablet Take 1 tablet (20 mg) by mouth daily. 30 tablet 2    etonogestrel (NEXPLANON) 68 MG IMPL 1 each (68 mg) by Subdermal route once.      fluticasone (FLONASE) 50 MCG/ACT nasal spray Spray 1 spray into both nostrils daily. 11.1 mL 0    ibuprofen (ADVIL/MOTRIN) 800 MG tablet Take 1 tablet (800 mg) by mouth every 8 hours as needed for moderate pain. 60 tablet 1    metFORMIN (GLUCOPHAGE XR) 500 MG 24 hr tablet Take 2 tablets (1,000 mg) by mouth daily AND 1 tablet (500 mg) daily (with dinner). For a total daily dose of 1500 mg. 90 tablet 2    naloxone (NARCAN) 4 MG/0.1ML nasal spray Spray 1 spray (4 mg) into one nostril alternating nostrils as needed for opioid reversal. every 2-3 minutes until assistance arrives (Patient taking differently: Spray 4 mg into one nostril alternating nostrils as needed for opioid reversal. every 2-3 minutes until assistance arrives/ has not used yet) 2 each 3    norgestimate-ethinyl  estradiol (ORTHO-CYCLEN) 0.25-35 MG-MCG tablet Take 1 tablet by mouth daily. 30 tablet 1    polyethylene glycol (MIRALAX) 17 GM/Dose powder Take 17 g (1 Capful) by mouth daily. 510 g 0    SUMAtriptan (IMITREX) 25 MG tablet Take 1 tablet (25 mg) by mouth at onset of headache for migraine. 1-2 tablets (25-50 mg) by mouth at onset of headache for migraine. May repeat in 2 hours. Max 8 tablets/24hours 9 tablet 1    triamcinolone (KENALOG) 0.025 % cream Apply topically daily as needed for irritation 80 g 0                     Data         8/30/2024     8:00 AM 9/17/2024     5:19 PM 1/9/2025     1:45 PM   PROMIS-10 Total Score w/o Sub Scores   PROMIS TOTAL - SUBSCORES 22    22 26 26        Patient-reported         12/10/2024     3:10 PM 1/13/2025     2:27 PM 1/13/2025     2:28 PM   PHQ-9 SCORE   PHQ-9 Total Score MyChart   17 (Moderately severe depression)   PHQ-9 Total Score 13 17         Patient-reported         12/4/2024     3:22 PM 12/10/2024     3:10 PM 1/9/2025     1:44 PM   SHIAVNI-7 SCORE   Total Score 13 (moderate anxiety)  13 (moderate anxiety)   Total Score 13  14 13        Patient-reported       Liver/Kidney Function, TSH Metabolic Blood counts   Recent Labs   Lab Test 07/18/24  1510 06/24/23  0717   AST 19 21   ALT 18 20   ALKPHOS 68 64   CR 0.75 0.57     Recent Labs   Lab Test 06/24/23  0717   TSH 1.44    Recent Labs   Lab Test 07/18/24  1510   CHOL 202*   TRIG 122*      HDL 71     Recent Labs   Lab Test 07/18/24  1510   A1C 5.0     Recent Labs   Lab Test 07/18/24  1510   GLC 81    Recent Labs   Lab Test 07/18/24  1510   WBC 9.1   HGB 12.5*   HCT 36.9   MCV 84              Level of Medical Decision Making:   - At least 1 chronic problem that is not stable  - Engaged in prescription drug management during visit (discussed any medication benefits, side effects, alternatives, etc.)       The longitudinal plan of care for the diagnosis(es)/condition(s) as documented were addressed during this visit.  Due to the added complexity in care, I will continue to support Jacqueline in the subsequent management and with ongoing continuity of care.      PROVIDER: Rosa Govea MD    Patient staffed in clinic with Dr. Geronimo.  Note will be reviewed and signed by Dr. Geronimo

## 2025-02-24 ENCOUNTER — TELEPHONE (OUTPATIENT)
Dept: PSYCHIATRY | Facility: CLINIC | Age: 20
End: 2025-02-24
Payer: COMMERCIAL

## 2025-02-24 ENCOUNTER — APPOINTMENT (OUTPATIENT)
Dept: PSYCHIATRY | Facility: CLINIC | Age: 20
End: 2025-02-24
Attending: PSYCHOLOGIST
Payer: COMMERCIAL

## 2025-02-24 DIAGNOSIS — F25.1 SCHIZOAFFECTIVE DISORDER, DEPRESSIVE TYPE (H): Primary | ICD-10-CM

## 2025-02-24 NOTE — TELEPHONE ENCOUNTER
SW Intern left voice mail for patient's mother/guardian Mandie ([phone]) offering care coordination support.     Plan: JORGE CC to check in next month by phone.

## 2025-02-26 ENCOUNTER — PATIENT OUTREACH (OUTPATIENT)
Dept: CARE COORDINATION | Facility: CLINIC | Age: 20
End: 2025-02-26
Payer: COMMERCIAL

## 2025-02-26 NOTE — PROGRESS NOTES
Clinic Care Coordination Contact  Care Team Conversations    Outreach postponed as patient is currently being followed by JORGE Burgos Intern.    Kay [facility]  Clinic Care Coordination  [facility]  [email or web address]  [phone]

## 2025-02-27 ENCOUNTER — VIRTUAL VISIT (OUTPATIENT)
Dept: PSYCHIATRY | Facility: CLINIC | Age: 20
End: 2025-02-27
Attending: STUDENT IN AN ORGANIZED HEALTH CARE EDUCATION/TRAINING PROGRAM
Payer: COMMERCIAL

## 2025-02-27 DIAGNOSIS — T88.7XXA MEDICATION SIDE EFFECTS: ICD-10-CM

## 2025-02-27 DIAGNOSIS — F41.1 GENERALIZED ANXIETY DISORDER: ICD-10-CM

## 2025-02-27 DIAGNOSIS — F25.1 SCHIZOAFFECTIVE DISORDER, DEPRESSIVE TYPE (H): Primary | ICD-10-CM

## 2025-02-27 DIAGNOSIS — F12.90 CANNABIS USE DISORDER: ICD-10-CM

## 2025-02-27 DIAGNOSIS — F90.2 ADHD (ATTENTION DEFICIT HYPERACTIVITY DISORDER), COMBINED TYPE: ICD-10-CM

## 2025-02-27 PROCEDURE — G2211 COMPLEX E/M VISIT ADD ON: HCPCS | Mod: 95

## 2025-02-27 PROCEDURE — 98006 SYNCH AUDIO-VIDEO EST MOD 30: CPT | Mod: GC

## 2025-02-28 ENCOUNTER — VIRTUAL VISIT (OUTPATIENT)
Dept: PSYCHIATRY | Facility: CLINIC | Age: 20
End: 2025-02-28
Payer: COMMERCIAL

## 2025-02-28 DIAGNOSIS — F17.200 TOBACCO USE DISORDER: ICD-10-CM

## 2025-02-28 DIAGNOSIS — F41.1 GENERALIZED ANXIETY DISORDER: ICD-10-CM

## 2025-02-28 DIAGNOSIS — F25.1 SCHIZOAFFECTIVE DISORDER, DEPRESSIVE TYPE (H): Primary | ICD-10-CM

## 2025-02-28 DIAGNOSIS — F12.90 CANNABIS USE DISORDER: ICD-10-CM

## 2025-02-28 DIAGNOSIS — F90.2 ADHD (ATTENTION DEFICIT HYPERACTIVITY DISORDER), COMBINED TYPE: ICD-10-CM

## 2025-02-28 PROCEDURE — 90832 PSYTX W PT 30 MINUTES: CPT | Mod: 95 | Performed by: PSYCHOLOGIST

## 2025-02-28 NOTE — NURSING NOTE
Current patient location: [address]  [facility] [address]    Is the patient currently in the state of [place]? YES    Visit mode: VIDEO    If the visit is dropped, the patient can be reconnected by:VIDEO VISIT: Text to cell phone:   Telephone Information:   Mobile [phone]       Will anyone else be joining the visit? NO  (If patient encounters technical issues they should call [phone])    Are changes needed to the allergy or medication list? No    Are refills needed on medications prescribed by this physician? NO    Rooming Documentation:  Questionnaire(s) completed    Reason for visit: RECHECK    Monique SOTELO

## 2025-02-28 NOTE — PROGRESS NOTES
"  Virtual Visit Details    Type of service:  Video Visit     Originating Location (pt. Location): Home    Distant Location (provider location):  On-site  Platform used for Video Visit: [facility]     Individual Psychotherapy Progress Note    Date: Feb 28, 2025    Patient Name: Lauren Montenegro     Preferred Name: \"Jacqueline\"    Patient Pronouns: She/Her, They/Them    Patient MRN: [id number]    Provider: Daniel Landauer, PhD, LP    Procedure: Individual DBT session    Appointment Duration: 3:00 to 3:35 PM (35 minutes)    People Present: Jacqueline and Dr. Landauer    Diagnosis:   Encounter Diagnoses   Name Primary?    Schizoaffective disorder, depressive type (H) Yes    ADHD (attention deficit hyperactivity disorder), combined type     Cannabis use disorder     Generalized anxiety disorder     Tobacco use disorder      Treatment Plan                                                                                              Problem 1: Jacqueline has significant difficulty regulating strong emotions and impulses effectively. This difficulty has contributed to history of suicidal ideation, multiple suicide attempts, self-harm behavior, risk-taking behaviors (including substance use), angry outbursts, and interpersonal relationship challenges.     Goal Jacqueline will demonstrate at least a 75% decrease in frequency and intensity of SIB and SI and will learn and utilize effectively alternative emotion regulation, distress tolerance, and impulse control strategies 75% of the time when needed prior to discharge..   Intervention Jacqueline will participate in full-model DBT including individual and family therapy and skills group in order to develop appropriate and effective emotion regulation, distress tolerance and impulse control skills.   Progress: Some progress. Jacqueline has demonstrated some overall improvement in emotion regulation and distress tolerance skills. There has been a " "significant decrease in frequency and intensity of suicidal ideation. Though she continues to experience some urges to self-harm, it has been several months since last engagement in SIB.  She still struggles with impulse control when emotions are intense, especially related to substance use and making risky/unsafe choices.  Target Date: 4/1/2025     Problem 2: Jacqueline reports experiencing worsening symptoms of depression and anxiety. These symptoms have contributed to and are exacerbated by interpersonal problems, academic problems, low self-esteem, and use of ineffective coping skills.     Goal \"I want to be happy, not looking at all negatives.\"     Jacqueline will demonstrate an increase in behavioral activation and effective coping by engaging in family and/or peer activities at least 2x per week, engaging in at least one pleasant or success activity per day, and use of coping skills in stressful situations at least 75% of the time..   Intervention Jacqueline will participate in full-model DBT including individual and family therapy and skills group in order to increase behavioral activation, develop stress management skills, improve mood, improve self-esteem, and manage anxiety.   Progress: Some progress. Jacqueline' mood has fluctuated since starting therapy. More recently she has indicated feeling more depressed and stressed and having increases in negative self-talk and decreases in self-esteem. She often worries that she is a burden on her family because of her mental health and substance use issues and feeling like she is always messing things up. She can experience dawood and has demonstrated overall improvement in motivation. She continues to struggle with sticking to plans to  improve healthy habits.  Target Date: 4/1/2025     Problem 3: Jacqueline has an extensive history of substance use and abuse, which has contributed to significant  interpersonal stress, academic issues, and depression and anxiety symptoms. She has had " "difficulty with maintaining sobriety despite these consequences. She has indicated little motivation to stay sober from Marijuana     Goal \"I want skills not to relapse.\"  Jacqueline will refrain from alcohol and drug use. She will learn and utilize alternative coping skills to substance use and will develop efficacy with refusing substances in peer situations, such that she is able to maintain 100% sobriety from alcohol, marijuana, and other drugs by the end of DBT programming   Intervention Jacqueline will participate in full-model DBT including individual and family therapy and skills group in order to develop effective distress tolerance and impulse control skills, so that she can resist urges to use substances, increase ability to make Wise Mind decisions about substance use, and increase awareness of factors that contribute to urges to use substances.   Progress: Intermittent progress. Jacqueline's dedication sobriety has waned over the last several months and she no longer wants to stay sober from marijuana. She continues to deny desire to use any other substances and states that she plans to stay clean from other drugs. She believes that she is able to regulate her marijuana use and not be dependent on it, though it is not clear if she is actually capable of only using marijuana recreationally.   Target Date: 4/1/2025     Treatment Plan Completed: 10/06/2023  Treatment Plan Updated/Reviewed: 2/14/2025  Treatment Plan Review Due: 5/01/2025  Session Content                                                                                               Subjective:  Jacqueline reported that she was doing \"okay\" today. She acknowledged that she continues to experience a lot of stress related to school. She noted that her relationship with her boyfriend is going better, though she still experiences anxiety about the relationship and still has a lot of negative thoughts about her self-worth. She endorsed that her weight has been a " "source of stress and anxiety. She would like to lose weight and eat healthier. Jacqueline shared that she was out with friends last weekend, had too much to drink, and then took a line of cocaine that was offered by a friend. Jacqueline reported feeling ashamed and regretful about doing \"hard\" drugs as she did not think that she would do them and it is something she has made an effort to stay away from. She indicated that she knows that it is not a good idea to spend time with the friend that offered her the cocaine and she is trying to figure out how distance herself from the friend.     Objective/Intervention:   Clinician utilized a DBT approach during the session. Clinician and Jacqueline reviewed the events since the last session. Clinician and Jacqueline assessed for safety issues and suicidal ideation. Clinician and [place] processed the substance use incident and completed a chain analysis of the situation. Clinician and Jacqueline reviewed strategies she can use to reduce the chances of this happening again, including choosing healthier friends to be around, limiting/controlling alcohol consumption, and reminding herself of her goals and values related to substance use.    Assessment:   Jacqueline  presented as alert and engaged today. She presented as very regretful of her decision to use cocaine and she was able to identify strategies she can use to prevent future lapses. She was receptive to the feedback that she does not have to hold on to this mistake as long as she can learn from it; beating herself up about it won't make her feel better. She denied experiencing suicidal ideation in the last week.    Primary Targets Addressed in This Session:  Life-Threatening Behavior: None  Therapy Interfering Behavior:   Quality of Life: Interpersonal relationships, family issues, Healthy habits, self-esteem, Substance use    DBT Skills reviewed or taught in Session:    ABC PLEASE skills, ACCEPTS/Self-soothe, Opposite Action, Pros and Cons, " checking the facts, STOP, DEAR MAN    Patient Used Phone Coaching Since Last Session: No     Chain Analysis/Solution Analysis? No    Behavioral Assignments:  1) Utilize learned DBT Skills  2) Engage in daily exercise, eat 3 meals a day, practice good sleep hygiene.     Plan: Patient will continue in individual DBT until she is able to transition to another therapist for longer-term care.    Mental Status                                                                                                Behavioral Observations/Mental Status: Patient arrived on time to session. Patient was adequately groomed. Eye contact was adequate. Mood today was pleasant and stressed. Observed affect was full range. Speech was regular rate and rhythm. Thought process was Logical and Linear. Patient was actively engaged in session.  Risk Assessment:     See above for current review of risk.    Jacqueline has a long history dating back to early adolescence of suicidal ideation, self-harm behavior, and multiple suicide attempts. Most recent suicide attempt occurred in June 2023 while she was in residential substance use treatment. This incident was triggered by a combination of being bullied by other residents and frustration with not being able to go home. Previous suicide attempts have been of an impulsive nature and are usually triggered by a specific event. She has tried hanging herself 3 times and drowning herself 1 time. She last engaged in self-injury about a month prior to this assessment. At the time of the assessment she is denying experiencing active suicidal thoughts and denying significant urges to self-injure. She denied  experiencing passive suicidal ideation in recent weeks and denied any in the last week.     At the time of the assessment, Jacqueline identifies several reasons for living and demonstrates future-oriented thinking. She indicated that she is confident that she will not attempt to kill herself again, especially if she  is able to stay in outpatient care. She identifies family and one friend as sources of social support who she can reach out to if she needs help or a distraction. She is able to identify other distractions and coping skills she can use if ideation gets more intense.     Based on risk and protective factors, patient is assessed to be at a Low Acute Risk (i.e., no current suicidal intent, specific plan, preparatory behaviors, and high confidence in patient's ability to maintain safety). She is likely at intermediate chronic risk for suicidal behavior given her challenges with emotion regulation and impulse control.    Daniel Landauer, PhD, LP

## 2025-03-02 NOTE — PROGRESS NOTES
"      [facility]     Individual Psychotherapy Progress Note    Date: Feb 14, 2025    Patient Name: Lauren Montenegro     Preferred Name: \"Jacqueline\"    Patient Pronouns: She/Her, They/Them    Patient MRN: [id number]    Provider: Daniel Landauer, PhD,     Procedure: Individual DBT session    Appointment Duration: 3:25 to 4:00 PM (35 minutes)    Interactive Complexity?: Yes, visit entailed Interactive Complexity evidenced by:  -The need to manage maladaptive communication (related to, e.g., high anxiety, high reactivity, repeated questions, or disagreement) among participants that complicates delivery of care; Jacqueline' difficulty with staying alert in the session interfered with her ability to engage in treatment today.    People Present: Jacqueline and Dr. Landauer    Diagnosis:   Encounter Diagnoses   Name Primary?    Schizoaffective disorder, depressive type (H) Yes    ADHD (attention deficit hyperactivity disorder), combined type     Cannabis use disorder     Generalized anxiety disorder     Tobacco use disorder      Treatment Plan                                                                                              Problem 1: Jacqueline has significant difficulty regulating strong emotions and impulses effectively. This difficulty has contributed to history of suicidal ideation, multiple suicide attempts, self-harm behavior, risk-taking behaviors (including substance use), angry outbursts, and interpersonal relationship challenges.     Goal Jacqueline will demonstrate at least a 75% decrease in frequency and intensity of SIB and SI and will learn and utilize effectively alternative emotion regulation, distress tolerance, and impulse control strategies 75% of the time when needed prior to discharge..   Intervention Jacqueline will participate in full-model DBT including individual and family therapy and skills group in order to develop appropriate and effective emotion regulation, " "distress tolerance and impulse control skills.   Progress: Some progress. Jacqueline has demonstrated some overall improvement in emotion regulation and distress tolerance skills. There has been a significant decrease in frequency and intensity of suicidal ideation. Though she continues to experience some urges to self-harm, it has been several months since last engagement in SIB.  She still struggles with impulse control when emotions are intense, especially related to substance use and making risky/unsafe choices.  Target Date: 4/1/2025     Problem 2: Jacqueline reports experiencing worsening symptoms of depression and anxiety. These symptoms have contributed to and are exacerbated by interpersonal problems, academic problems, low self-esteem, and use of ineffective coping skills.     Goal \"I want to be happy, not looking at all negatives.\"     Jacqueline will demonstrate an increase in behavioral activation and effective coping by engaging in family and/or peer activities at least 2x per week, engaging in at least one pleasant or success activity per day, and use of coping skills in stressful situations at least 75% of the time..   Intervention Jacqueline will participate in full-model DBT including individual and family therapy and skills group in order to increase behavioral activation, develop stress management skills, improve mood, improve self-esteem, and manage anxiety.   Progress: Some progress. Jacqueline' mood has fluctuated since starting therapy. More recently she has indicated feeling more depressed and stressed and having increases in negative self-talk and decreases in self-esteem. She often worries that she is a burden on her family because of her mental health and substance use issues and feeling like she is always messing things up. She can experience dawood and has demonstrated overall improvement in motivation. She continues to struggle with sticking to plans to  improve healthy habits.  Target Date: 4/1/2025     Problem " "3: Jacqueline has an extensive history of substance use and abuse, which has contributed to significant  interpersonal stress, academic issues, and depression and anxiety symptoms. She has had difficulty with maintaining sobriety despite these consequences. She has indicated little motivation to stay sober from Marijuana     Goal \"I want skills not to relapse.\"  Jacqueline will refrain from alcohol and drug use. She will learn and utilize alternative coping skills to substance use and will develop efficacy with refusing substances in peer situations, such that she is able to maintain 100% sobriety from alcohol, marijuana, and other drugs by the end of DBT programming   Intervention Jacqueline will participate in full-model DBT including individual and family therapy and skills group in order to develop effective distress tolerance and impulse control skills, so that she can resist urges to use substances, increase ability to make Wise Mind decisions about substance use, and increase awareness of factors that contribute to urges to use substances.   Progress: Intermittent progress. Jacqueline's dedication sobriety has waned over the last several months and she no longer wants to stay sober from marijuana. She continues to deny desire to use any other substances and states that she plans to stay clean from other drugs. She believes that she is able to regulate her marijuana use and not be dependent on it, though it is not clear if she is actually capable of only using marijuana recreationally.   Target Date: 3/1/2025        Treatment Plan Completed: 10/06/2023  Treatment Plan Updated/Reviewed: 2/14/2025  Treatment Plan Review Due: 5/01/2025  Session Content                                                                                               Subjective:  Jacqueline reported that she was late to the appointment due to the bad weather today. She noted that she was feeling very tired today and indicated she was having a hard time staying " alert. She endorsed some passive suicidal ideation, but denied any plan or intent to act on suicidal ideation. She thinks that her relationship with her boyfriend is going better, though she continues to experience daily fears that the relationship will end.     Objective/Intervention:   Clinician utilized a DBT approach during the session. Clinician and [place] reviewed the events since the last session. Clinician and [place] assessed for safety issues and suicide risk. Clinician tried to work with Jacqueline to stay alert during the session. Clinician and [place] reviewed challenges to getting enough sleep and challenges to engaging healthy behaviors that would help to improve energy levels. Clinician and [place] further explored successes and challenges in her romantic relationship.    Assessment:   Jacqueline  presented as very sleepy and it was very difficult to get her to stay alert during the session. Clinician needed to repeat things often as it was apparent she was not hearing what was said. She denied currently being on any substances that could contribute to her sleepiness. As in past sessions when she was this sleepy, she often apologized for not being able to stay awake, but had a hard time engaging in behavior that would actually keep her awake.    Primary Targets Addressed in This Session:  Life-Threatening Behavior: Suicidal Ideation  Therapy Interfering Behavior: falling asleep, difficulty staying alert in session  Quality of Life: Interpersonal relationships, family issues, Healthy habits, self-esteem    DBT Skills reviewed or taught in Session:    ABC PLEASE skills, ACCEPTS/Self-soothe, Opposite Action, Pros and Cons, checking the facts, STOP, DEAR MAN    Patient Used Phone Coaching Since Last Session: No     Chain Analysis/Solution Analysis? No    Behavioral Assignments:  1) Utilize learned DBT Skills  2) Engage in daily exercise, eat 3 meals a day, practice good sleep hygiene.     Plan: Patient will continue in  individual DBT until she is able to transition to another therapist for longer-term care.    Mental Status                                                                                                Behavioral Observations/Mental Status: Patient arrived on time to session. Patient was adequately groomed. Eye contact was  poor due to sleepiness . Mood today was tired. Observed affect was restricted. Speech was regular rate and rhythm. Thought process was Logical and Linear. Patient was not actively engaged in session.    Risk Assessment:     Jacqueline has a long history dating back to early adolescence of suicidal ideation, self-harm behavior, and multiple suicide attempts. Most recent suicide attempt occurred in June 2023 while she was in residential substance use treatment. This incident was triggered by a combination of being bullied by other residents and frustration with not being able to go home. Previous suicide attempts have been of an impulsive nature and are usually triggered by a specific event. She has tried hanging herself 3 times and drowning herself 1 time. She last engaged in self-injury about a month prior to this assessment. At the time of the assessment she is denying experiencing active suicidal thoughts and denying significant urges to self-injure. She denied  experiencing passive suicidal ideation in recent weeks and denied any in the last week.     At the time of the assessment, Jacqueline identifies several reasons for living and demonstrates future-oriented thinking. She indicated that she is confident that she will not attempt to kill herself again, especially if she is able to stay in outpatient care. She identifies family and one friend as sources of social support who she can reach out to if she needs help or a distraction. She is able to identify other distractions and coping skills she can use if ideation gets more intense.     Based on risk and protective factors, patient is assessed to be  at a Low Acute Risk (i.e., no current suicidal intent, specific plan, preparatory behaviors, and high confidence in patient's ability to maintain safety). She is likely at intermediate chronic risk for suicidal behavior given her challenges with emotion regulation and impulse control.    Daniel Landauer, PhD, LP

## 2025-03-06 RX ORDER — ARIPIPRAZOLE 10 MG/1
10 TABLET ORAL AT BEDTIME
Qty: 30 TABLET | Refills: 2 | Status: SHIPPED | OUTPATIENT
Start: 2025-03-06

## 2025-03-06 RX ORDER — ATOMOXETINE 40 MG/1
40 CAPSULE ORAL DAILY
Qty: 30 CAPSULE | Refills: 2 | Status: SHIPPED | OUTPATIENT
Start: 2025-03-06

## 2025-03-06 RX ORDER — METFORMIN HYDROCHLORIDE 500 MG/1
TABLET, EXTENDED RELEASE ORAL
Qty: 90 TABLET | Refills: 2 | Status: SHIPPED | OUTPATIENT
Start: 2025-03-06

## 2025-03-06 RX ORDER — ESCITALOPRAM OXALATE 20 MG/1
20 TABLET ORAL DAILY
Qty: 30 TABLET | Refills: 2 | Status: SHIPPED | OUTPATIENT
Start: 2025-03-06

## 2025-03-06 NOTE — PATIENT INSTRUCTIONS
Treatment plan: no medication changes    **For crisis resources, please see the information at the end of this document**   Patient Education    Thank you for coming to the [facility] [place] [facility].     Lab Testing:  If you had lab testing today and your results are reassuring or normal they will be mailed to you or sent through [employer] within 7 days. If the lab tests need quick action we will call you with the results. The phone number we will call with results is # [phone]. If this is not the best number please call our clinic and change the number.     Medication Refills:  If you need any refills please call your pharmacy and they will contact us. Our fax number for refills is [phone].   Three business days of notice are needed for general medication refill requests.   Five business days of notice are needed for controlled substance refill requests.   If you need to change to a different pharmacy, please contact the new pharmacy directly. The new pharmacy will help you get your medications transferred.     Contact Us:  Please call [phone] during business hours (8-5:00 M-F).   If you have medication related questions after clinic hours, or on the weekend, please call [phone].     Financial Assistance [phone]   Medical Records [phone]       MENTAL HEALTH CRISIS RESOURCES:  For a emergency help, please call 911 or go to the nearest Emergency Department.     Emergency Walk-In Options:   [facility] Unit @ [facility] ([place]): [phone] - Specialized mental health emergency area designed to be calming  [facility] ([place]): [phone]  [facility] ([place]): [phone]  [facility]): [phone]    [facility] Crisis Information:   [place]: [phone]  [place]: [phone]  [place] ([place]) - Adult: [phone]     Child: [phone]  [place] - Adult: [phone]     Child:  [phone]  [place]: [phone]  List of all [facility] resources:   https://[place].gov/dhs/people-we-serve/adults/health-care/mental-health/resources/crisis-contacts.jsp    National Crisis Information:   Crisis Text Line: Text  [place]  to 542756  Suicide & Crisis Lifeline: 988  National Suicide Prevention Lifeline: 9-168-974-TALK (1-576.627.2354)       For online chat options, visit https://suicidepreventionlifeline.org/chat/  Poison Control Center: 6-997-805-4536  Trans Lifeline: 5-944-688-6876 - Hotline for transgender people of all ages  [employer]: 3-532-071-0762 - Hotline for LGBT youth     For Non-Emergency Support:   Fast Tracker: Mental Health & Substance Use Disorder Resources -   https://www.MobicioustrackTopion.org/

## 2025-03-07 ENCOUNTER — OFFICE VISIT (OUTPATIENT)
Dept: PSYCHIATRY | Facility: CLINIC | Age: 20
End: 2025-03-07
Attending: SOCIAL WORKER
Payer: COMMERCIAL

## 2025-03-07 DIAGNOSIS — F90.2 ADHD (ATTENTION DEFICIT HYPERACTIVITY DISORDER), COMBINED TYPE: ICD-10-CM

## 2025-03-07 DIAGNOSIS — F25.1 SCHIZOAFFECTIVE DISORDER, DEPRESSIVE TYPE (H): Primary | ICD-10-CM

## 2025-03-07 DIAGNOSIS — F12.90 CANNABIS USE DISORDER: ICD-10-CM

## 2025-03-09 NOTE — PROGRESS NOTES
GEORGE GRANDA    BD. 2005  DX. SCHIZOAFFECTIVE DISORDER, ADHD  CODE. [id number] THERAPY WITHOUT PATIENT;  IN PERSON    START TIME. 8.45AM  END TIME. 9.45AM  SEEN BY SHARLA BASILIO, PHD    Meeting with Jacqueline  parents, Sierra, this is a second time.  Jacqueline continues to struggle to maintain any reliable schedule, pattern of wellbeing, and continues to express her distress as crisis that demand parental help.  Mother reported that she had access to 3 hours of supportive help daily, but yesterday left school and dismissed the helper/ unclear why the county person didn t resist aJcqueline  dismissal.  Daily life tasks (laundry, cleaning etc.) are not preferred tasks.  Mother also reports she removed Xbox etc. from Jacqueline  room, to reduce her time on games (preferred task).  Father expressed exhaustion trying to respond to her crises and his own inability to work when she becomes relentless with calls, texts. When he doesn t respond she escalates to suicidal threats.     Parents are her legal guardians and want to assure she finished high school.  They accept her boyfriend (she spends much of weekend with him and his family); see her not using staff at school.  Mother does report that when father is traveling, she can negotiate with Jacqueline to assure she can work (reduced cries for help), suggesting Jacqueline can regulate her distress.  But they have been hesitant to make demands of her.    Impressions and plan     My formulation is that while she aspires to independence she remains deeply dependent on her parents for regulation, especially when she is faced with tasks that are difficult, unpleasant etc.  And they are caught in the most exhausting  call and response  interactions--where she will literally call incessantly, and with increasing agitation and threats about her safety.    Contact Dan Landauer, PhD, her therapist using DBT to make sure we are on the same page/ Jacqueline doesn t need any therapeutic  I spoke to pt and scheduled appt on 10-17-23 at 8 am.  Pt will come in fasting for the visit.   confusion.  My ideas moving ahead: it seems wise to use her dependence with some greater intention to help her manage some things better (like, taking her meds; getting to sleep; ); second goal would be to shift her immediate need for distress mediation to other adults during times when her parents are not available/ decide they are unavailable.  I would also like to strengthen her use of county staff time to help her do things (non preferred tasks).  And although therapists in the past have told her that video games are good for soothing it it has become her next addiction, and she avoids most everything else besides boyfriend/ friends.  Mother s actions may prove to be a good move IF we can help her find other soothing mechanisms but confer with Dr. landauer about this.  Plan to meet in 2 weeks with parents AND Jacqueline to see if I can engage her.  Renata Rivas, PhD

## 2025-03-14 ENCOUNTER — VIRTUAL VISIT (OUTPATIENT)
Dept: PSYCHIATRY | Facility: CLINIC | Age: 20
End: 2025-03-14
Payer: COMMERCIAL

## 2025-03-14 DIAGNOSIS — F17.200 TOBACCO USE DISORDER: ICD-10-CM

## 2025-03-14 DIAGNOSIS — F90.2 ADHD (ATTENTION DEFICIT HYPERACTIVITY DISORDER), COMBINED TYPE: ICD-10-CM

## 2025-03-14 DIAGNOSIS — F25.1 SCHIZOAFFECTIVE DISORDER, DEPRESSIVE TYPE (H): Primary | ICD-10-CM

## 2025-03-14 DIAGNOSIS — F12.90 CANNABIS USE DISORDER: ICD-10-CM

## 2025-03-14 PROCEDURE — 90834 PSYTX W PT 45 MINUTES: CPT | Mod: 95 | Performed by: PSYCHOLOGIST

## 2025-03-14 NOTE — PROGRESS NOTES
"  Virtual Visit Details    Type of service:  Video Visit     Originating Location (pt. Location): Home    Distant Location (provider location):  On-site  Platform used for Video Visit: [facility]     Individual Psychotherapy Progress Note    Date: Mar 14, 2025    Patient Name: Lauren Montenegro     Preferred Name: \"Jacqueline\"    Patient Pronouns: She/Her, They/Them    Patient MRN: [id number]    Provider: Daniel Landauer, PhD, LP    Procedure: Individual DBT session    Appointment Duration: 3:00 to 3:50 PM (50 minutes)    People Present: Jacqueline and Dr. Landauer    Diagnosis:   Encounter Diagnoses   Name Primary?    Schizoaffective disorder, depressive type (H) Yes    ADHD (attention deficit hyperactivity disorder), combined type     Cannabis use disorder     Tobacco use disorder        Treatment Plan                                                                                              Problem 1: Jacqueline has significant difficulty regulating strong emotions and impulses effectively. This difficulty has contributed to history of suicidal ideation, multiple suicide attempts, self-harm behavior, risk-taking behaviors (including substance use), angry outbursts, and interpersonal relationship challenges.     Goal Jacqueline will demonstrate at least a 75% decrease in frequency and intensity of SIB and SI and will learn and utilize effectively alternative emotion regulation, distress tolerance, and impulse control strategies 75% of the time when needed prior to discharge..   Intervention Jacqueline will participate in full-model DBT including individual and family therapy and skills group in order to develop appropriate and effective emotion regulation, distress tolerance and impulse control skills.   Progress: Some progress. Jacqueline has demonstrated some overall improvement in emotion regulation and distress tolerance skills. There has been a significant decrease in frequency " "and intensity of suicidal ideation. Though she continues to experience some urges to self-harm, it has been several months since last engagement in SIB.  She still struggles with impulse control when emotions are intense, especially related to substance use and making risky/unsafe choices.  Target Date: 4/1/2025     Problem 2: Jacqueline reports experiencing worsening symptoms of depression and anxiety. These symptoms have contributed to and are exacerbated by interpersonal problems, academic problems, low self-esteem, and use of ineffective coping skills.     Goal \"I want to be happy, not looking at all negatives.\"     Jacqueline will demonstrate an increase in behavioral activation and effective coping by engaging in family and/or peer activities at least 2x per week, engaging in at least one pleasant or success activity per day, and use of coping skills in stressful situations at least 75% of the time..   Intervention Jacqueline will participate in full-model DBT including individual and family therapy and skills group in order to increase behavioral activation, develop stress management skills, improve mood, improve self-esteem, and manage anxiety.   Progress: Some progress. Jacqueline' mood has fluctuated since starting therapy. More recently she has indicated feeling more depressed and stressed and having increases in negative self-talk and decreases in self-esteem. She often worries that she is a burden on her family because of her mental health and substance use issues and feeling like she is always messing things up. She can experience dawood and has demonstrated overall improvement in motivation. She continues to struggle with sticking to plans to  improve healthy habits.  Target Date: 4/1/2025     Problem 3: Jacqueline has an extensive history of substance use and abuse, which has contributed to significant  interpersonal stress, academic issues, and depression and anxiety symptoms. She has had difficulty with maintaining sobriety " "despite these consequences. She has indicated little motivation to stay sober from Marijuana     Goal \"I want skills not to relapse.\"  Jacqueline will refrain from alcohol and drug use. She will learn and utilize alternative coping skills to substance use and will develop efficacy with refusing substances in peer situations, such that she is able to maintain 100% sobriety from alcohol, marijuana, and other drugs by the end of DBT programming   Intervention Jacqueline will participate in full-model DBT including individual and family therapy and skills group in order to develop effective distress tolerance and impulse control skills, so that she can resist urges to use substances, increase ability to make Wise Mind decisions about substance use, and increase awareness of factors that contribute to urges to use substances.   Progress: Intermittent progress. Jacqueline's dedication sobriety has waned over the last several months and she no longer wants to stay sober from marijuana. She continues to deny desire to use any other substances and states that she plans to stay clean from other drugs. She believes that she is able to regulate her marijuana use and not be dependent on it, though it is not clear if she is actually capable of only using marijuana recreationally.   Target Date: 4/1/2025     Treatment Plan Completed: 10/06/2023  Treatment Plan Updated/Reviewed: 2/14/2025  Treatment Plan Review Due: 5/01/2025  Session Content                                                                                               Subjective:  Jacqueline reported that she was feeling angry at parents because they took the Xbox out of her room because they felt it was impacting her sleep. She reported that she was so upset about this she has been thinking of going to stay with her boyfriend for a couple of weeks. She noted that she tried talking to parents about the situation, but they did not want to talk about it.    Jacqueline also shared that she " finally got a job and will be working at [employer] at the [employer]. She acknowledged feeling both relieved and excited. She also stated that she has been going to her graphic design class more consistently. She expressed some hesitation about participating in family therapy, especially given her current feelings about parents.    Objective/Intervention:   Clinician utilized a DBT approach during the session. Clinician and Jacqueline reviewed the events since the last session. Clinician assessed for safety issues and suicidal ideation. Clinician and Jacqueline processed her anger and frustration with her parents regarding the Xbox situation. Clinician and Jacqueline reviewed the rationale for parents making the decision they did and discussed what parents need to see from her in order to get her Xbox back in her room. Clinician and Jacqueline explored the pros and cons of going to stay with boyfriend because of the situation, especially in the context of her just starting a new job. Finally, Yordy and Jacqueline discussed her upcoming family session/meeting with the family therapist.    Assessment:   Jacqueline  presented as alert and engaged today. She was clearly upset by her parents' reactions and the consequences they imposed. She was pretty set on temporarily moving in her boyfriend until she was able to see the bigger picture; she was about to start her first job and it is probably best to make one change at a time, especially if she wants to focus on being successful at her job. She was hesitant, but willing to meet with the family therapist next week.    Primary Targets Addressed in This Session:  Life-Threatening Behavior: None; denied suicidal ideation and self-harm urges  Therapy Interfering Behavior:   Quality of Life: Interpersonal relationships, family issues, Healthy habits, self-esteem, Substance use    DBT Skills reviewed or taught in Session:    ABC PLEASE skills, ACCEPTS/Self-soothe, Opposite Action, Pros and Cons,  checking the facts, STOP, DEAR MAN    Patient Used Phone Coaching Since Last Session: No     Chain Analysis/Solution Analysis? No    Behavioral Assignments:  1) Utilize learned DBT Skills  2) Engage in daily exercise, eat 3 meals a day, practice good sleep hygiene.     Plan: Patient will continue in individual DBT until she is able to transition to another therapist for longer-term care.    Mental Status                                                                                                Behavioral Observations/Mental Status: Patient arrived on time to session. Patient was adequately groomed. Eye contact was adequate. Mood today was pleasant and annnoyed. Observed affect was full range. Speech was regular rate and rhythm. Thought process was Logical and Linear. Patient was actively engaged in session.  Risk Assessment:     See above for current review of risk.    Jacqueline has a long history dating back to early adolescence of suicidal ideation, self-harm behavior, and multiple suicide attempts. Most recent suicide attempt occurred in June 2023 while she was in residential substance use treatment. This incident was triggered by a combination of being bullied by other residents and frustration with not being able to go home. Previous suicide attempts have been of an impulsive nature and are usually triggered by a specific event. She has tried hanging herself 3 times and drowning herself 1 time. She last engaged in self-injury about a month prior to this assessment. At the time of the assessment she is denying experiencing active suicidal thoughts and denying significant urges to self-injure. She denied  experiencing passive suicidal ideation in recent weeks and denied any in the last week.     At the time of the assessment, Jacqueline identifies several reasons for living and demonstrates future-oriented thinking. She indicated that she is confident that she will not attempt to kill herself again, especially if she  is able to stay in outpatient care. She identifies family and one friend as sources of social support who she can reach out to if she needs help or a distraction. She is able to identify other distractions and coping skills she can use if ideation gets more intense.     Based on risk and protective factors, patient is assessed to be at a Low Acute Risk (i.e., no current suicidal intent, specific plan, preparatory behaviors, and high confidence in patient's ability to maintain safety). She is likely at intermediate chronic risk for suicidal behavior given her challenges with emotion regulation and impulse control.    Daniel Landauer, PhD, LP

## 2025-03-14 NOTE — NURSING NOTE
[facility] [id number]    Is the patient currently in the state of [place]? YES    Visit mode: VIDEO    If the visit is dropped, the patient can be reconnected by:VIDEO VISIT: Send to e-mail at: [email or web address]    Will anyone else be joining the visit? NO  (If patient encounters technical issues they should call [phone]    Are changes needed to the allergy or medication list? No    Are refills needed on medications prescribed by this physician? NO    Rooming Documentation:  Questionnaire(s) completed    Reason for visit: ISIDORO SOTELO

## 2025-03-20 ENCOUNTER — VIRTUAL VISIT (OUTPATIENT)
Facility: CLINIC | Age: 20
End: 2025-03-20
Payer: COMMERCIAL

## 2025-03-20 ENCOUNTER — TELEPHONE (OUTPATIENT)
Dept: PSYCHIATRY | Facility: CLINIC | Age: 20
End: 2025-03-20
Payer: COMMERCIAL

## 2025-03-20 DIAGNOSIS — Z71.89 COUNSELING AND COORDINATION OF CARE: Primary | ICD-10-CM

## 2025-03-20 DIAGNOSIS — F41.1 GAD (GENERALIZED ANXIETY DISORDER): ICD-10-CM

## 2025-03-20 DIAGNOSIS — F25.1 SCHIZOAFFECTIVE DISORDER, DEPRESSIVE TYPE (H): Primary | ICD-10-CM

## 2025-03-20 DIAGNOSIS — F12.90 CANNABIS USE DISORDER: ICD-10-CM

## 2025-03-20 DIAGNOSIS — F90.2 ADHD (ATTENTION DEFICIT HYPERACTIVITY DISORDER), COMBINED TYPE: ICD-10-CM

## 2025-03-20 ASSESSMENT — ANXIETY QUESTIONNAIRES
IF YOU CHECKED OFF ANY PROBLEMS ON THIS QUESTIONNAIRE, HOW DIFFICULT HAVE THESE PROBLEMS MADE IT FOR YOU TO DO YOUR WORK, TAKE CARE OF THINGS AT HOME, OR GET ALONG WITH OTHER PEOPLE: SOMEWHAT DIFFICULT
7. FEELING AFRAID AS IF SOMETHING AWFUL MIGHT HAPPEN: MORE THAN HALF THE DAYS
4. TROUBLE RELAXING: SEVERAL DAYS
7. FEELING AFRAID AS IF SOMETHING AWFUL MIGHT HAPPEN: MORE THAN HALF THE DAYS
5. BEING SO RESTLESS THAT IT IS HARD TO SIT STILL: NEARLY EVERY DAY
GAD7 TOTAL SCORE: 13
3. WORRYING TOO MUCH ABOUT DIFFERENT THINGS: MORE THAN HALF THE DAYS
8. IF YOU CHECKED OFF ANY PROBLEMS, HOW DIFFICULT HAVE THESE MADE IT FOR YOU TO DO YOUR WORK, TAKE CARE OF THINGS AT HOME, OR GET ALONG WITH OTHER PEOPLE?: SOMEWHAT DIFFICULT
GAD7 TOTAL SCORE: 13
GAD7 TOTAL SCORE: 13
2. NOT BEING ABLE TO STOP OR CONTROL WORRYING: MORE THAN HALF THE DAYS
1. FEELING NERVOUS, ANXIOUS, OR ON EDGE: MORE THAN HALF THE DAYS
6. BECOMING EASILY ANNOYED OR IRRITABLE: SEVERAL DAYS

## 2025-03-20 NOTE — PROGRESS NOTES
[facility]                                     Progress Note    Patient Name: Lauren Montenegro  Date: 3/20/2025         Service Type: Individual      Session Start Time: 3:32 PM  Session End Time: 4:24 PM     Session Length: 52 Minutes    Session #: 7    Attendees: Client attended alone    Service Modality: Video Visit:      Provider verified identity through the following two step process.  Patient provided:  Patient is known previously to provider    Telemedicine Visit: The patient's condition can be safely assessed and treated via synchronous audio and visual telemedicine encounter.      Reason for Telemedicine Visit: Patient convenience (e.g. access to timely appointments / distance to available provider)    Originating Site (Patient Location): Patient's home    Distant Site (Provider Location): Provider Remote Setting- Home Office    Consent:  The patient/guardian has verbally consented to: the potential risks and benefits of telemedicine (video visit) versus in person care; bill my insurance or make self-payment for services provided; and responsibility for payment of non-covered services.     Patient would like the video invitation sent by:  My Chart    Mode of Communication:  Video Conference via [employer]    Distant Location (Provider):  Off-site    As the provider I attest to compliance with applicable laws and regulations related to telemedicine.    DATA  Extended Session (53+ minutes): No  Interactive Complexity: No  Crisis: No      Progress Since Last Session (Related to Symptoms / Goals / Homework):   Symptoms: Improving slight reduction in anxiety, low mood, irritation, and reports no SI/SIB over the past two weeks.      Homework: Partially completed      Episode of Care Goals: No improvement - CONTEMPLATION (Considering change and yet undecided); Intervened by assessing the negative and positive thinking (ambivalence) about behavior change     Current / Ongoing Stressors and  "Concerns:    Desires to cut back on cannabis use and alcohol. She reports daily use of cannabis in the afternoons after class to help her \"calm down and relax before bed.\" She states she only drinks on the weekends with friends. She recently started dating someone and is feeling that they are  moving too fast yest notes she feels she will lose him if she says no to him.   Due to being at her boyfriend's house she did not feel comfortable talking about certain topics. Client asked to end her session early again this week.     Treatment Objective(s) Addressed in This Session:   use cognitive strategies identified in therapy to challenge anxious thoughts  Decrease frequency and intensity of feeling down, depressed, hopeless  Harm reduction     Intervention:  Therapist utilized reflected listening as patient gave brief reflection of the past two months. The patient  reports a slight reduction in anxiety, low mood, irritation and no passive SI and SIB over the past two weeks. She reflected on how helpful her in home  [facility] was before they quit. She processed her frustration with herself for for  \"not doing the things I need to to be an independent adult. I still need my parents.\" She notes how her lack of being responsible for herself resulting in her parents taking away her Xbox which he knows she spends too much time on. She reports she is starting a job on Monday and is very excited for that. Therapist supported patient as she processed and validated patient. Therapist engaged in cognitive restructuring/ reframing, looked at cognitive distortions and challenged distorted thoughts.     Assessments completed prior to visit:  The following assessments were completed by patient for this visit:  PHQ2:       1/24/2025     2:53 PM 9/13/2024    10:18 AM 8/2/2024     3:00 PM 5/11/2023     2:19 PM   PHQ-2 ( 1999 [employer])   Q1: Little interest or pleasure in doing things 0 0 0 1    Q2: Feeling down, " depressed or hopeless 1 0 1 1    PHQ-2 Score 1 0 1 2    2   Q1: Little interest or pleasure in doing things    Several days   Q2: Feeling down, depressed or hopeless    Several days   PHQ-2 Score    2       Proxy-reported     PHQ9:       11/7/2024     3:25 PM 11/11/2024     2:11 PM 12/4/2024     3:21 PM 12/10/2024     3:10 PM 1/13/2025     2:27 PM 1/13/2025     2:28 PM 3/20/2025     3:26 PM   PHQ-9 SCORE   PHQ-9 Total Score MyChart 18 (Moderately severe depression) 13 (Moderate depression) 14 (Moderate depression)   17 (Moderately severe depression) 10 (Moderate depression)   PHQ-9 Total Score 18  13  14  13 17   10        Patient-reported     GAD2:       7/18/2024     1:09 PM 7/31/2024     4:51 PM 8/28/2024     8:58 AM 10/28/2024     1:54 PM 11/7/2024     3:26 PM 1/9/2025     1:44 PM 3/20/2025     3:27 PM   SHIVANI-2   Feeling nervous, anxious, or on edge 2 2  2  3 2 2 2   Not being able to stop or control worrying 2 2  2   2 2 2   SHIVANI-2 Total Score 4 4    4 4  4  4  4        Patient-reported    Proxy-reported     GAD7:       8/28/2024     8:58 AM 9/17/2024     5:19 PM 11/7/2024     3:26 PM 12/4/2024     3:22 PM 12/10/2024     3:10 PM 1/9/2025     1:44 PM 3/20/2025     3:27 PM   SHIVANI-7 SCORE   Total Score 11 (moderate anxiety)   13 (moderate anxiety) 13 (moderate anxiety)  13 (moderate anxiety) 13 (moderate anxiety)   Total Score 11 6 13  13  14 13  13        Patient-reported    Proxy-reported     CAGE-AID:       6/18/2024     8:04 AM   CAGE-AID Total Score   Total Score 1   Total Score MyChart 1 (A total score of 2 or greater is considered clinically significant)        Proxy-reported     PROMIS 10-Global Health (all questions and answers displayed):       4/3/2024     5:10 PM 6/18/2024     8:04 AM 7/11/2024     2:38 PM 7/18/2024     1:10 PM 8/30/2024     8:00 AM 9/17/2024     5:19 PM 1/9/2025     1:45 PM   PROMIS 10   In general, would you say your health is: Good  Good  Good Fair Good   Fair   In general, would you  say your quality of life is: Very good  Very good  Good Very good Very good   Good   In general, how would you rate your physical health? Fair  Good  Fair Fair Fair   Fair   In general, how would you rate your mental health, including your mood and your ability to think? Good  Good  Good Good Fair   Good   In general, how would you rate your satisfaction with your social activities and relationships? Very good  Very good  Good Good Good   Good   In general, please rate how well you carry out your usual social activities and roles Good  Good  Fair Good Fair   Good   To what extent are you able to carry out your everyday physical activities such as walking, climbing stairs, carrying groceries, or moving a chair? Completely  Completely  Completely Mostly Mostly   Completely   In the past 7 days, how often have you been bothered by emotional problems such as feeling anxious, depressed, or irritable? Often  Sometimes  Sometimes Sometimes Often   Often   In the past 7 days, how would you rate your fatigue on average? Moderate  Moderate  Moderate Moderate Moderate   Moderate   In the past 7 days, how would you rate your pain on average, where 0 means no pain, and 10 means worst imaginable pain? 0  3  0 0 7   0   In general, would you say your health is: 3  3  3 2 3  3 2   In general, would you say your quality of life is: 4  4  3 4 4  3 3   In general, how would you rate your physical health? 2  3  2 2 2  3 2   In general, how would you rate your mental health, including your mood and your ability to think? 3  3  3 3 2  2 3   In general, how would you rate your satisfaction with your social activities and relationships? 4  4  3 3 3  3 3   In general, please rate how well you carry out your usual social activities and roles. (This includes activities at home, at work and in your community, and responsibilities as a parent, child, spouse, employee, friend, etc.) 3  3  2 3 2  3 3   To what extent are you able to carry out  your everyday physical activities such as walking, climbing stairs, carrying groceries, or moving a chair? 5  5  5 4 4  3 5   In the past 7 days, how often have you been bothered by emotional problems such as feeling anxious, depressed, or irritable? 4  3  3 3 4  3 4   In the past 7 days, how would you rate your fatigue on average? 3  3  3 3 3  2 3   In the past 7 days, how would you rate your pain on average, where 0 means no pain, and 10 means worst imaginable pain? 0  3  0 0 7  0 0   Global Mental Health Score 13    13 14 12 13 11    11 11 11    Global Physical Health Score 15    15 15 15 14 11    11 15 15    PROMIS TOTAL - SUBSCORES 28    28 29 27 27 22    22 26 26        Patient-reported    Proxy-reported     [place] Suicide Severity Rating Scale (Lifetime/Recent)      11/15/2023    10:31 PM 11/16/2023     9:15 AM 6/18/2024     8:47 AM 2/13/2025     1:00 PM 2/13/2025     1:03 PM 2/13/2025     2:20 PM 2/13/2025     5:55 PM   [place] Suicide Severity Rating (Lifetime/Recent)   Q1 Wished to be Dead (Past Month) no no  1-->yes -- 1-->yes 1-->yes   Q2 Suicidal Thoughts (Past Month) yes no  1-->yes  1-->yes 1-->yes   Q3 Suicidal Thought Method yes   1-->yes  1-->yes 1-->yes   Q4 Suicidal Intent without Specific Plan no   0-->no  0-->no 0-->no   Q5 Suicide Intent with Specific Plan yes   0-->no  0-->no 0-->no   Q6 Suicide Behavior (Lifetime) yes   1-->yes  1-->yes    If yes to Q6, within past 3 months? no yes  0-->no  0-->no 0-->no   Level of Risk per Screen high risk   moderate risk  moderate risk moderate risk   1. Wish to be Dead (Lifetime)   Y       Wish to be Dead Description (Lifetime)   in 8th grade,thoughts  and did something that she can't remember.       1. Wish to be Dead (Past 1 Month)   N       2. Non-Specific Active Suicidal Thoughts (Lifetime)   N       Most Severe Ideation Rating (Lifetime)   1       Most Severe Ideation Rating (Past 1 Month)  2 --    --   Description of Most Severe Ideation (Past 1  Month)       describes experiencing heightened SI today due to stressors; denies current SI   Frequency (Lifetime)   1       Frequency (Past 1 Month)  2 --    --   Duration (Lifetime)   1       Duration (Past 1 Month)  2 --    --   Controllability (Lifetime)   1       Controllability (Past 1 Month)  2 1    --   Deterrents (Lifetime)   0       Deterrents (Past 1 Month)  2 0    --   Reasons for Ideation (Lifetime)   0       Reasons for Ideation (Past 1 Month)  5 0    --   Actual Attempt (Lifetime)   N       Actual Attempt (Past 3 Months)  N     N   Total Number of Actual Attempts (Past 3 Months)       0   Actual Attempt Description (Past 3 Months)       NA   Has subject engaged in non-suicidal self-injurious behavior? (Lifetime)   N       Has subject engaged in non-suicidal self-injurious behavior? (Past 3 Months)  N     N   Interrupted Attempts (Lifetime)   N       Interrupted Attempts (Past 3 Months)  N     N   Total Number of Interrupted Attempts (Past 3 Months)       0   Interrupted Attempt Description (Past 3 Months)       NA   Aborted or Self-Interrupted Attempt (Lifetime)   N       Aborted or Self-Interrupted Attempt (Past 3 Months)  N     N   Total Number of Aborted or Self-Interrupted Attempts (Past 3 Months)       0   Aborted or Self-Interrupted Attempt Description (Past 3 Months)       NA   Preparatory Acts or Behavior (Lifetime)   N       Preparatory Acts or Behavior (Past 3 Months)  N     N   Total Number of Preparatory Acts (Past 3 Months)       0   Preparatory Acts or Behavior Description (Past 3 Months)       NA   Calculated C-SSRS Risk Score (Lifetime/Recent)  No Risk Indicated No Risk Indicated    No Risk Indicated       ASSESSMENT: Current Emotional / Mental Status (status of significant symptoms):   Risk status (Self / Other harm or suicidal ideation)   Patient denies current fears or concerns for personal safety.   Patient denies current or recent suicidal ideation or behaviors.   Patient denies  current or recent homicidal ideation or behaviors.   Patient reports current or recent self injurious behavior or ideation including thought of SIB, denies plan and intent .   Patient denies other safety concerns.   Patient reports there has been no change in risk factors since their last session.     Patient reports there has been no change in protective factors since their last session.     A safety and risk management plan has been developed including: Patient consented to co-developed safety plan on 6/18/2024.  Safety and risk management plan was reviewed.   Patient agreed to use safety plan should any safety concerns arise.  A copy was made available to the patient.  Patient denied needing to updated it.      Appearance:   Appropriate    Eye Contact:   Fair    Psychomotor Behavior: Normal    Attitude:   Cooperative    Orientation:   All   Speech    Rate / Production: Normal/ Responsive    Volume:  Normal    Mood:    Anxious  Depressed    Affect:    Appropriate  Subdued    Thought Content:  Clear    Thought Form:  Coherent  Logical    Insight:    Fair      Medication Review:   No changes to current psychiatric medication(s)     Medication Compliance:   Yes     Changes in Health Issues:   None reported     Chemical Use Review:   Substance Use: Chemical use reviewed, no active concerns identified      Tobacco Use: No current tobacco use.      Diagnosis:  1. Schizoaffective disorder, depressive type (H)    2. ADHD (attention deficit hyperactivity disorder), combined type    3. SHIVANI (generalized anxiety disorder)    4. Cannabis use disorder      Collateral Reports Completed:   Not Applicable    PLAN: (Patient Tasks / Therapist Tasks / Other):  Your options until the next visit:  Patient will keep her goal to use marijuana only before bed time and michelle   Patient will share her goal with family for support  Patient will keep her plan to use a daily planner to help with memory and organization.  Patient's next visit  "is in 1 week.   Patient will be at home for next session.      Prisca Stallings, LICSW     ______________________________________________________________________    Individual Treatment Plan    Patient's Name: Lauren Montenegro  YOB: 2005    Date of Creation: 8/06/2024  Date Treatment Plan Last Reviewed/Revised: 1/13/2025    DSM5 Diagnoses: Attention-Deficit/Hyperactivity Disorder  314.01 (F90.2) Combined presentation, 295.70  (F25.1) Schizoaffective Disorder Depressive Type, or Substance-Related & Addictive Disorders 304.30 (F12.20) Cannabis Use Disorder Moderate  current use    Psychosocial / Contextual Factors:Per patient's father, \"Help with managing mental health challenges as noted in her chart. This also includes coaching on substance use, and support in the development of executive function skills. She has been working to overcome developmental issues since early childhood.\"     PROMIS (reviewed every 90 days): 27    Referral / Collaboration:  Referral to another professional/service is not indicated at this time..    Anticipated number of session for this episode of care: 9-12 sessions  Anticipation frequency of session: Biweekly  Anticipated Duration of each session: 38-52 minutes  Treatment plan will be reviewed in 90 days or when goals have been changed.     MeasurableTreatment Goal(s) related to diagnosis / functional impairment(s)  Goal 1: Patient will accept the marijuana use as a mood altering product that would affect her levels of functioning.     I will know I've met my goal when I have  cut use of 3 times of use daily and $ 75/ week spending to at least once a day and $ 75/month      Objective #A (Patient Action)    Patient will  smoke less and only at the bed time .  Status: Continued - Date(s): 1/13/2025    Intervention(s)  Therapist will teach emotional regulation skills. : new and healthy coping skills .    Objective #B  Patient will use daily planner at least 85 % of the " time.  Status: Continued - Date(s): 1/13/2025    Intervention(s)  Therapist will teach tips for ADHD .    Goal 2: Patient will control or eliminate active psychotic symptoms, increase goal directed behaviors     I will know I've met my goal when I am  able to Id the negative symptoms and positive symptoms and replace them with healthy coping skills.      Objective #A (Patient Action)    Status: Continued - Date(s): 1/13/2025  Patient will  practice the reality check skills as needed .  Intervention(s)  Therapist will provide education around the symptoms of her condition using the Reality check option .    Patient has reviewed and agreed to the above plan.    USMAN Waters  January 13, 2025

## 2025-03-20 NOTE — TELEPHONE ENCOUNTER
Social Work Intern spoke with Jacqueline Lockwood' mom/guardian ([phone]) to assess care coordination and resource needs.      Assessment: Mandie reported Jacqueline is doing well, PCA staff have frequent turnover, which is hard to develop relationship.   Care Gaps: She reported [facility] is not very helpful. They would like more help with medication reminders 2x per day. PCA was calling 2x per day to remind her to take meds and brush teeth- however PCA staff have quit without notice. Mandie reported goal for Jacqueline to attend work/education program next year and be preparing to have the skills she needs to live on her own or in supportive setting after graduation.    Intervention/Education provided during outreach: SW Intern suggested coordination with [facility] Waiver to request medication machine or nursing service to set up medications. Mandie agreed to speech with [facility] CM regarding this, verbalized understanding, engaged in AIDET communication during patient encounter.    Plan:  Care Coordinator will follow up in one month.

## 2025-03-21 ENCOUNTER — OFFICE VISIT (OUTPATIENT)
Dept: PSYCHIATRY | Facility: CLINIC | Age: 20
End: 2025-03-21
Attending: SOCIAL WORKER
Payer: COMMERCIAL

## 2025-03-21 DIAGNOSIS — F25.1 SCHIZOAFFECTIVE DISORDER, DEPRESSIVE TYPE (H): Primary | ICD-10-CM

## 2025-03-21 DIAGNOSIS — F90.2 ADHD (ATTENTION DEFICIT HYPERACTIVITY DISORDER), COMBINED TYPE: ICD-10-CM

## 2025-03-21 NOTE — PROGRESS NOTES
[facility]  Psychiatry Clinic  Psychological Testing by Psychometrist     Lauren Montenegro MRN# [id number]   Age: 20 year old YOB: 2005     Date:  2/24/25    Video- Visit Details   Type of service:  video visit  Video start time:  4:00 PM  Video end time:  4:45 PM  Originating location (patient location):  [facility]   Location- Home  Distant Site (provider location): HIPAA compliant location Off-site  Platform used for video visit:  Secure real time interactive audio and visual telecommunication system via [employer]     Attendees: Patient attended the session alone  : No, English    Identifying Information/Reason for Referral  Lauren Montenegro is a 20 year old adult who prefers the name Jacqueline & pronouns she.  Jacqueline has a history of Schizoaffective Disorder.  The patient was seen for psychological testing. The purpose of the current psychological evaluation was to provide information about Jacqueline's social, emotional and cognitive functioning, to assist with diagnostic clarification and to guide her treatment and recovery planning. Results of the following assessments are to be used in conjunction with the standard diagnostic evaluation.    A total of 60 minutes were spent in test administration and scoring by this writer, BRIT MONROY psychometriskana.  See Testing Evaluation Report on future date for a full interpretation of the findings and data.     Summary of Services/Procedures  Jacqueline was administered a psychological test battery tailored to her age and the referral questions.     Tests Administered  COMPASS 10   Quality of Life Scale- Abbreviated        Behavioral Observations  (NOTE: Additional behavioral observations and summary statement regarding validity of testing completed. Delete out grey italics before closing note).  Overall, Jacqueline demonstrated good effort throughout testing. Therefore, the current evaluation results are thought to  provide a accurate representation of her functioning in the areas assessed.     Plan  (NOTE: Delete out grey italics before closing note. If more testing is scheduled, use this:)  Testing is NOT complete. Patient is scheduled to return to complete additional testing on March / 13 / 2025 .    [If completed] The patient and their invited support system will participate in a feedback appointment on a future date with their clinician.     Will coordinate care with other treatment providers to assist with planning as needed.      BRIT MONROY  Psychometrist      Billing for this encounter (CPT 31118, [id number]) will occur on a later date when a qualified health professional reviews the findings with the patient in a psychological evaluation appointment (CPT 93936, [id number]).

## 2025-03-21 NOTE — PROGRESS NOTES
JACQUELINE GRANDA  BD. 2.28,2005  DX, schizoaffective disorder, ADHD  Code. 07144 family therapy with patient  Start. 9AM  End. 10AM  Seen by CIERA Hudson    Jacqueline and her parents are seeing, after two sessions with parents alone.  She is willing to be in session is not overly anxious or scattered as parents fears.  She seemed organized and able to communicate.  She did get increasingly tired as we talked, and seemed startled/in agreement when I suggested her anxiety is expressed in fatigue. Mother reports anytime a new provider comes, she falls asleep. Strengths program staff have observed similarly that Jacqueline if often asleep in sessions. I was able to keep her engaged for the entire hour and as she yawns could identify what was stressful in the moment.    For content; she had been without Xbox for two weeks (father shut off access).instead of framing this as their taking control I described it as an experiment: what had she learned?  She had better weeks at school, was able to get a part time job on her own, and slept more.  She wants to fight less with her parents although the quality of fighting feels like what is typical for a your adolescent.      We also worked on her willingness to use other people to manage her distress/ she habitually calls father during days.  She wanted to split-- as did they--mental health distress and physical needs but these are so intertwined. She identified a person at her school, her boyfriend as other people she could use/ she did not know about DBT coaching component.    We talked about another experiment: Jacqueline stated she wanted Xbox access and would turn it off by 9PM; father is traveling this weekend so mother felt unable to manage the internet so this created a positive opportunity.  Jacqueline will recall the benefits of less michelle and work to stay within these limits, she will also manage her distress because he will not be available, she will manage school and also  manage stomach aches (are these related to weed?/ she insists not so she can learn to tolerate and keep attending).  They are joining him n travel in one week, so the length of time is finite to try this plan.      Also worked on her room/ need to clean, there is an ADHD component to her not being able but also a defiance,  dog got into barrera and became very ill, she expressed remorse but fighting bruce her from appreciating cause.effect of her actions.  We will use this to engage county support help, but her parents ineffectiveness reinforces how they are all stuck.      Impressions and plan:  Jacqueline appears as much younger than her age (more like 12/13-age when she started to show symptoms) and she is clearly ambivalent about adulting. We talked about her desire for independence and her ability to rely on others besides her parents as part of that process.  We also identified her desire to  not fight  and the need for the family to shift from control to supporting her efforts/ experiments. She needs to see that she has more control-- or accept that because of her illness, she needs this help.     Plan is to meet again but because of their schedules, not until 4.18.    Renata Rivas, PhD

## 2025-03-21 NOTE — PROGRESS NOTES
[facility]  Psychiatry Clinic  Psychological Evaluation Testing Note     Lauren Montenegro MRN# [id number]   Age: 20 year old YOB: 2005     Date:  2/17/25    Video- Visit Details   Type of service:  video visit  Video start time:  1;35 PM  Originating location (patient location):  [facility]   Location- [place]  Distant Site (provider location): HIPAA compliant location Off-site  Platform used for video visit:  Secure real time interactive audio and visual telecommunication system via [employer]     Had to cut the the appointment short because patient was tired  BRIT MONROY  Psychometrist      This is a non-billable encounter as it was solely for the purposes of completing initial assessments. Billing will occur during clinical interpretation and review of findings at a future date.

## 2025-03-21 NOTE — PROGRESS NOTES
[facility]  Psychiatry Clinic  Psychological Testing by Psychometrist     Lauren Montenegro MRN# [id number]   Age: 20 year old YOB: 2005     Date:  2/12/25    Video- Visit Details   Type of service:  video visit  Video start time: 3:30 PM  Video end time:  4:30 PM  Originating location (patient location):  [facility]   Location- Home  Distant Site (provider location): HIPAA compliant location Off-site  Platform used for video visit:  Secure real time interactive audio and visual telecommunication system via [employer]     Attendees: Patient attended the session alone  : No, English    Identifying Information/Reason for Referral  Lauren Montenegro is a 20 year old adult who prefers the name Jacqueline & pronouns she.  Jacqueline has a history of Schizoaffective Disorder .  The patient was seen for psychological testing. The purpose of the current psychological evaluation was to provide information about Jacqueline's social, emotional and cognitive functioning, to assist with diagnostic clarification and to guide her treatment and recovery planning. Results of the following assessments are to be used in conjunction with the standard diagnostic evaluation.    A total of 60  minutes were spent in test administration and scoring by this writer, BRIT MONROY psychometrist.  See Testing Evaluation Report on future date for a full interpretation of the findings and data.     Summary of Services/Procedures  Jacqueline was administered a psychological test battery tailored to her age and the referral questions.     Tests Administered  [place] Assessment of Need - Short Appraisal Schedule (CANSAS)  EPINET Core Assessment Battery  Ongoing  [place] Depression Scale for Schizophrenia- CDSS  [place]-Suicide Severity Rating Scale Interview - Lifetime  Shared Decision Making in Clinical Encounters  Intersectional Discrimination Index  Stress Screener for Recent Events  Structured  Interview for Psychosis-Risk Syndromes (SIPS) Raw Scores  COMPASS 10   Quality of Life Scale- Abbreviated        Behavioral Observations  (NOTE: Additional behavioral observations and summary statement regarding validity of testing completed. Delete out grey italics before closing note).  Overall, Duck demonstrated good effort throughout testing. Therefore, the current evaluation results are thought to provide a accurate representation of her functioning in the areas assessed.     Plan  (NOTE: Delete out grey italics before closing note. If more testing is scheduled, use this:)  Testing is NOT complete. Patient is scheduled to return to complete additional testing on February / 12 / 2025 .    [If completed] The patient and their invited support system will participate in a feedback appointment on a future date with their clinician.     Will coordinate care with other treatment providers to assist with planning as needed.      BRIT MONROY  Psychometrist      Billing for this encounter (CPT 35454, 98961) will occur on a later date when a qualified health professional reviews the findings with the patient in a psychological evaluation appointment (CPT 92070, 36983).

## 2025-04-01 ENCOUNTER — PATIENT OUTREACH (OUTPATIENT)
Dept: CARE COORDINATION | Facility: CLINIC | Age: 20
End: 2025-04-01
Payer: COMMERCIAL

## 2025-04-01 NOTE — PROGRESS NOTES
Clinic Care Coordination Contact  Care Team Conversations    Outreach postponed as patient is currently being followed by JORGE Burgos Intern.    Kay Schneider [facility]  Clinic Care Coordination  [facility]  [email or web address]  [phone]

## 2025-04-07 ENCOUNTER — OFFICE VISIT (OUTPATIENT)
Facility: CLINIC | Age: 20
End: 2025-04-07
Payer: COMMERCIAL

## 2025-04-07 DIAGNOSIS — F90.2 ADHD (ATTENTION DEFICIT HYPERACTIVITY DISORDER), COMBINED TYPE: ICD-10-CM

## 2025-04-07 DIAGNOSIS — F41.1 GAD (GENERALIZED ANXIETY DISORDER): ICD-10-CM

## 2025-04-07 DIAGNOSIS — F12.90 CANNABIS USE DISORDER: ICD-10-CM

## 2025-04-07 DIAGNOSIS — F25.1 SCHIZOAFFECTIVE DISORDER, DEPRESSIVE TYPE (H): Primary | ICD-10-CM

## 2025-04-07 PROCEDURE — 90834 PSYTX W PT 45 MINUTES: CPT

## 2025-04-07 NOTE — PROGRESS NOTES
"[facility]                                     Progress Note    Patient Name: Lauren Montenegro  Date: 4/7/2025         Service Type: Individual      Session Start Time: 2:52 PM  Session End Time: 3:35 PM     Session Length: 43 Minutes    Session #: 8    Attendees: Client attended alone    Service Modality: In Person    DATA  Extended Session (53+ minutes): No  Interactive Complexity: No  Crisis: No      Progress Since Last Session (Related to Symptoms / Goals / Homework):   Symptoms: Improving slight reduction in anxiety, low mood, irritation, and reports no SI/SIB over the past two weeks.      Homework: Partially completed      Episode of Care Goals: No improvement - CONTEMPLATION (Considering change and yet undecided); Intervened by assessing the negative and positive thinking (ambivalence) about behavior change     Current / Ongoing Stressors and Concerns:    Desires to cut back on cannabis use and alcohol. She reports daily use of cannabis in the afternoons after class to help her \"calm down and relax before bed\" and whenever she is with her boyfriend. She reflects she feels pressured to smoke than she would like to when she is with him. She states she only drinks on the weekends with friends. She is dating someone and is feeling that they are  moving too fast yest notes she feels she will lose him if she says no to him.        Treatment Objective(s) Addressed in This Session:   use cognitive strategies identified in therapy to challenge anxious thoughts  Decrease frequency and intensity of feeling down, depressed, hopeless  Harm reduction     Intervention:  Therapist utilized reflected listening as patient gave brief reflection of the past few weeks. The patient  reports a slight reduction in anxiety, low mood, irritation and no passive SI and SIB over the past two weeks. She reflected on how helpful her in home  (maybe [facility] worker) was before they quit. She processed her " anxiety about her future and if her romantic relationship has a future. She reflected on how she has been staying up late with her boyfriend and playing Xbox now that she has it back.  She started her job and is looking forward to her two shifts this week. Therapist supported patient as she processed and validated patient. Therapist engaged in cognitive restructuring/ reframing, looked at cognitive distortions and challenged distorted thoughts.     Assessments completed prior to visit:  The following assessments were completed by patient for this visit:  PHQ2:       1/24/2025     2:53 PM 9/13/2024    10:18 AM 8/2/2024     3:00 PM 5/11/2023     2:19 PM   PHQ-2 ( 1999 [employer])   Q1: Little interest or pleasure in doing things 0 0 0 1    Q2: Feeling down, depressed or hopeless 1 0 1 1    PHQ-2 Score 1 0 1 2    2   Q1: Little interest or pleasure in doing things    Several days   Q2: Feeling down, depressed or hopeless    Several days   PHQ-2 Score    2       Proxy-reported     PHQ9:       11/7/2024     3:25 PM 11/11/2024     2:11 PM 12/4/2024     3:21 PM 12/10/2024     3:10 PM 1/13/2025     2:27 PM 1/13/2025     2:28 PM 3/20/2025     3:26 PM   PHQ-9 SCORE   PHQ-9 Total Score MyChart 18 (Moderately severe depression) 13 (Moderate depression) 14 (Moderate depression)   17 (Moderately severe depression) 10 (Moderate depression)   PHQ-9 Total Score 18  13  14  13 17   10        Patient-reported     GAD2:       7/18/2024     1:09 PM 7/31/2024     4:51 PM 8/28/2024     8:58 AM 10/28/2024     1:54 PM 11/7/2024     3:26 PM 1/9/2025     1:44 PM 3/20/2025     3:27 PM   SHIVANI-2   Feeling nervous, anxious, or on edge 2 2  2  3 2 2 2   Not being able to stop or control worrying 2 2  2   2 2 2   SHIVANI-2 Total Score 4 4    4 4  4  4  4        Patient-reported    Proxy-reported     GAD7:       8/28/2024     8:58 AM 9/17/2024     5:19 PM 11/7/2024     3:26 PM 12/4/2024     3:22 PM 12/10/2024     3:10 PM 1/9/2025     1:44 PM 3/20/2025      3:27 PM   SHIVANI-7 SCORE   Total Score 11 (moderate anxiety)   13 (moderate anxiety) 13 (moderate anxiety)  13 (moderate anxiety) 13 (moderate anxiety)   Total Score 11 6 13  13  14 13  13        Patient-reported    Proxy-reported     CAGE-AID:       6/18/2024     8:04 AM   CAGE-AID Total Score   Total Score 1   Total Score MyChart 1 (A total score of 2 or greater is considered clinically significant)        Proxy-reported     PROMIS 10-Global Health (all questions and answers displayed):       4/3/2024     5:10 PM 6/18/2024     8:04 AM 7/11/2024     2:38 PM 7/18/2024     1:10 PM 8/30/2024     8:00 AM 9/17/2024     5:19 PM 1/9/2025     1:45 PM   PROMIS 10   In general, would you say your health is:  Good    Good   Fair   In general, would you say your quality of life is:  Very good    Very good   Good   In general, how would you rate your physical health?  Good    Fair   Fair   In general, how would you rate your mental health, including your mood and your ability to think?  Good    Fair   Good   In general, how would you rate your satisfaction with your social activities and relationships?  Very good    Good   Good   In general, please rate how well you carry out your usual social activities and roles  Good    Fair   Good   To what extent are you able to carry out your everyday physical activities such as walking, climbing stairs, carrying groceries, or moving a chair?  Completely    Mostly   Completely   In the past 7 days, how often have you been bothered by emotional problems such as feeling anxious, depressed, or irritable?  Sometimes    Often   Often   In the past 7 days, how would you rate your fatigue on average?  Moderate    Moderate   Moderate   In the past 7 days, how would you rate your pain on average, where 0 means no pain, and 10 means worst imaginable pain?  3    7   0   In general, would you say your health is:  3    3  3    In general, would you say your quality of life is:  4    4  3    In general,  how would you rate your physical health?  3    2  3    In general, how would you rate your mental health, including your mood and your ability to think?  3    2  2    In general, how would you rate your satisfaction with your social activities and relationships?  4    3  3    In general, please rate how well you carry out your usual social activities and roles. (This includes activities at home, at work and in your community, and responsibilities as a parent, child, spouse, employee, friend, etc.)  3    2  3    To what extent are you able to carry out your everyday physical activities such as walking, climbing stairs, carrying groceries, or moving a chair?  5    4  3    In the past 7 days, how often have you been bothered by emotional problems such as feeling anxious, depressed, or irritable?  3    4  3    In the past 7 days, how would you rate your fatigue on average?  3    3  2    In the past 7 days, how would you rate your pain on average, where 0 means no pain, and 10 means worst imaginable pain?  3    7  0    Global Mental Health Score      14   11    11 11    Global Physical Health Score      15   11    11 15    PROMIS TOTAL - SUBSCORES      29   22    22 26        Information is confidential and restricted. Go to Review Flowsheets to unlock data.    Proxy-reported     [place] Suicide Severity Rating Scale (Lifetime/Recent)      11/15/2023    10:31 PM 11/16/2023     9:15 AM 6/18/2024     8:47 AM 2/13/2025     1:00 PM 2/13/2025     1:03 PM 2/13/2025     2:20 PM 2/13/2025     5:55 PM   [place] Suicide Severity Rating (Lifetime/Recent)   Q1 Wished to be Dead (Past Month) no no  1-->yes -- 1-->yes 1-->yes   Q2 Suicidal Thoughts (Past Month) yes no  1-->yes  1-->yes 1-->yes   Q3 Suicidal Thought Method yes   1-->yes  1-->yes 1-->yes   Q4 Suicidal Intent without Specific Plan no   0-->no  0-->no 0-->no   Q5 Suicide Intent with Specific Plan yes   0-->no  0-->no 0-->no   Q6 Suicide Behavior (Lifetime) yes   1-->yes   1-->yes    If yes to Q6, within past 3 months? no yes  0-->no  0-->no 0-->no   Level of Risk per Screen high risk   moderate risk  moderate risk moderate risk   1. Wish to be Dead (Lifetime)   Y       Wish to be Dead Description (Lifetime)   in 8th grade,thoughts  and did something that she can't remember.       1. Wish to be Dead (Past 1 Month)   N       2. Non-Specific Active Suicidal Thoughts (Lifetime)   N       Most Severe Ideation Rating (Lifetime)   1       Most Severe Ideation Rating (Past 1 Month)  2 --    --   Description of Most Severe Ideation (Past 1 Month)       describes experiencing heightened SI today due to stressors; denies current SI   Frequency (Lifetime)   1       Frequency (Past 1 Month)  2 --    --   Duration (Lifetime)   1       Duration (Past 1 Month)  2 --    --   Controllability (Lifetime)   1       Controllability (Past 1 Month)  2 1    --   Deterrents (Lifetime)   0       Deterrents (Past 1 Month)  2 0    --   Reasons for Ideation (Lifetime)   0       Reasons for Ideation (Past 1 Month)  5 0    --   Actual Attempt (Lifetime)   N       Actual Attempt (Past 3 Months)  N     N   Total Number of Actual Attempts (Past 3 Months)       0   Actual Attempt Description (Past 3 Months)       NA   Has subject engaged in non-suicidal self-injurious behavior? (Lifetime)   N       Has subject engaged in non-suicidal self-injurious behavior? (Past 3 Months)  N     N   Interrupted Attempts (Lifetime)   N       Interrupted Attempts (Past 3 Months)  N     N   Total Number of Interrupted Attempts (Past 3 Months)       0   Interrupted Attempt Description (Past 3 Months)       NA   Aborted or Self-Interrupted Attempt (Lifetime)   N       Aborted or Self-Interrupted Attempt (Past 3 Months)  N     N   Total Number of Aborted or Self-Interrupted Attempts (Past 3 Months)       0   Aborted or Self-Interrupted Attempt Description (Past 3 Months)       NA   Preparatory Acts or Behavior (Lifetime)   N        Preparatory Acts or Behavior (Past 3 Months)  N     N   Total Number of Preparatory Acts (Past 3 Months)       0   Preparatory Acts or Behavior Description (Past 3 Months)       NA   Calculated C-SSRS Risk Score (Lifetime/Recent)  No Risk Indicated No Risk Indicated    No Risk Indicated       ASSESSMENT: Current Emotional / Mental Status (status of significant symptoms):   Risk status (Self / Other harm or suicidal ideation)   Patient denies current fears or concerns for personal safety.   Patient denies current or recent suicidal ideation or behaviors.   Patient denies current or recent homicidal ideation or behaviors.   Patient denies current or recent self injurious behavior or ideation.   Patient denies other safety concerns.   Patient reports there has been no change in risk factors since their last session.     Patient reports there has been no change in protective factors since their last session.     A safety and risk management plan has been developed including: Patient consented to co-developed safety plan on 6/18/2024.  Safety and risk management plan was reviewed.   Patient agreed to use safety plan should any safety concerns arise.  A copy was made available to the patient.  Patient denied needing to updated it.      Appearance:   Appropriate    Eye Contact:   Poor   Psychomotor Behavior: Normal    Attitude:   Cooperative  Guarded    Orientation:   All   Speech    Rate / Production: Mumbled Normal     Volume:  Soft    Mood:    Depressed  Tired   Affect:    Subdued    Thought Content:  Clear    Thought Form:  Coherent  Logical    Insight:    Fair      Medication Review:   No changes to current psychiatric medication(s)     Medication Compliance:   Yes     Changes in Health Issues:   None reported     Chemical Use Review:   Substance Use: Chemical use reviewed, no active concerns identified      Tobacco Use: No current tobacco use.      Diagnosis:  1. Schizoaffective disorder, depressive type (H)    2.  "ADHD (attention deficit hyperactivity disorder), combined type    3. SHIVANI (generalized anxiety disorder)    4. Cannabis use disorder      Collateral Reports Completed:   Not Applicable    PLAN: (Patient Tasks / Therapist Tasks / Other):  Your options until the next visit:  Patient will keep her goal to use marijuana only before bed time and michelle   Say not to partner when she has smoked enough  Patient will keep her plan to use a daily planner to help with memory and organization.      Prisca Chandrakant, LICSW     ______________________________________________________________________    Individual Treatment Plan    Patient's Name: Lauren Montenegro  YOB: 2005    Date of Creation: 8/06/2024  Date Treatment Plan Last Reviewed/Revised: 1/13/2025    DSM5 Diagnoses: Attention-Deficit/Hyperactivity Disorder  314.01 (F90.2) Combined presentation, 295.70  (F25.1) Schizoaffective Disorder Depressive Type, or Substance-Related & Addictive Disorders 304.30 (F12.20) Cannabis Use Disorder Moderate  current use    Psychosocial / Contextual Factors:Per patient's father, \"Help with managing mental health challenges as noted in her chart. This also includes coaching on substance use, and support in the development of executive function skills. She has been working to overcome developmental issues since early childhood.\"     PROMIS (reviewed every 90 days): 27    Referral / Collaboration:  Referral to another professional/service is not indicated at this time..    Anticipated number of session for this episode of care: 9-12 sessions  Anticipation frequency of session: Biweekly  Anticipated Duration of each session: 38-52 minutes  Treatment plan will be reviewed in 90 days or when goals have been changed.     MeasurableTreatment Goal(s) related to diagnosis / functional impairment(s)  Goal 1: Patient will accept the marijuana use as a mood altering product that would affect her levels of functioning.     I will know I've " met my goal when I have  cut use of 3 times of use daily and $ 75/ week spending to at least once a day and $ 75/month      Objective #A (Patient Action)    Patient will  smoke less and only at the bed time .  Status: Continued - Date(s): 1/13/2025    Intervention(s)  Therapist will teach emotional regulation skills. : new and healthy coping skills .    Objective #B  Patient will use daily planner at least 85 % of the time.  Status: Continued - Date(s): 1/13/2025    Intervention(s)  Therapist will teach tips for ADHD .    Goal 2: Patient will control or eliminate active psychotic symptoms, increase goal directed behaviors     I will know I've met my goal when I am  able to Id the negative symptoms and positive symptoms and replace them with healthy coping skills.      Objective #A (Patient Action)    Status: Continued - Date(s): 1/13/2025  Patient will  practice the reality check skills as needed .  Intervention(s)  Therapist will provide education around the symptoms of her condition using the Reality check option .    Patient has reviewed and agreed to the above plan.    USMAN Waters  January 13, 2025

## 2025-04-17 ENCOUNTER — TELEPHONE (OUTPATIENT)
Dept: PSYCHIATRY | Facility: CLINIC | Age: 20
End: 2025-04-17
Payer: COMMERCIAL

## 2025-04-17 DIAGNOSIS — Z71.89 COUNSELING AND COORDINATION OF CARE: Primary | ICD-10-CM

## 2025-04-17 NOTE — TELEPHONE ENCOUNTER
Social Work intern left voice mail for patient's mother Mandie ([phone]) offering care coordination support.  Plan- Will follow up in one month.

## 2025-04-18 ENCOUNTER — OFFICE VISIT (OUTPATIENT)
Dept: PSYCHIATRY | Facility: CLINIC | Age: 20
End: 2025-04-18
Attending: SOCIAL WORKER
Payer: COMMERCIAL

## 2025-04-18 ENCOUNTER — APPOINTMENT (OUTPATIENT)
Dept: PSYCHIATRY | Facility: CLINIC | Age: 20
End: 2025-04-18
Payer: COMMERCIAL

## 2025-04-18 DIAGNOSIS — F17.200 TOBACCO USE DISORDER: ICD-10-CM

## 2025-04-18 DIAGNOSIS — F90.2 ADHD (ATTENTION DEFICIT HYPERACTIVITY DISORDER), COMBINED TYPE: ICD-10-CM

## 2025-04-18 DIAGNOSIS — F41.1 GENERALIZED ANXIETY DISORDER: ICD-10-CM

## 2025-04-18 DIAGNOSIS — F12.90 CANNABIS USE DISORDER: ICD-10-CM

## 2025-04-18 DIAGNOSIS — F25.1 SCHIZOAFFECTIVE DISORDER, DEPRESSIVE TYPE (H): Primary | ICD-10-CM

## 2025-04-18 PROCEDURE — 90834 PSYTX W PT 45 MINUTES: CPT | Mod: 95 | Performed by: PSYCHOLOGIST

## 2025-04-18 NOTE — PROGRESS NOTES
GEORGE GRANDA  BD. 2005  DX. Schizaffective disorder, adhd, anxiety, depression  CODE. 14360 FAMILY THERAPY WITH PATIENT, IN PERSON  START. 10AM  END. 10.55AM  SEEN BY SHARLA BASILIO, PHD    Jacqueline and her parents are seen. She is more engaged and able to participate with me in conversation.  They did not travel abroad as planned (sick dog) but she did manage mother s absence (business trip) well enough. She reports less phone calls when distressed, more texting,and dad confirmed constant is less about mental health content (feeling suicidal etc.) and more about physical illness.  They elaborated: she will often ask to leave school with symptoms (fever) but then feel better at home and want to spend time with boyfriend.  We worked on boredom = anxiety =physical symptoms/ which are occurring at school but not at work.  She is going to work (4 hour shifts) without any unbearable distress.  So task #1: to talk to school about finding ways to manage, reduce anxiety, increase confidence she can find solution/ she will initiate this herself.  Only 7 weeks more/ time limited distress.  Will attend new program in fall.  Did confirm: she uses headphones there but is not hearing voices/ now to manage unwanted stimulation.    Task #2: budgeting. She is earning money but has little sense of reasonable costs, parents continue to subsidize her.  Given choice of parents  help vs. neutral person, she wants neutral;. Suggest either finding someone at school, or using [employer] waiver person but address that she was falling asleep when this person came to home/ her need to  hire  someone to help her learn and then stay active and engaged.  Mother will contact waiver people.    Task #3: differentiating work from school/ she feels good about working although has fears that it won t last.  When asked about who she talks to. She identified DAn Landauer/ therapist/ more than parents.  Support using support.    Task# 4: sleep.  Jacqueline yawned continually/ continues to have very erratic sleep schedule, waking at night, last night ordered Pack s at 1.30AM (and inadvertently woke mother).  Talks about importance of sleep/ cannot identify last time she slept the whole night.  Will discuss this with Dr. Govea to see if any medication plan could help.  Better to shift this as something we can work towards, than to make this a demand yet.     Impressions and plan. Jacqueline seemed more engaged and able to participate than in earlier session/ some fatigue but not the anticipated restlessness that parents had predicted.  She appears willing to try new ideas/ but will see, keep tasks very deliberate and identify what she can do/ what others will do.  She appears motivated to be more independent.  Watch out for ambivalence about this.  Next time: address how she can rely on parents as adult to adult support, move from conflicted dependence.  will meet again in 2+ weeks and collaborate with Drs. Landauer and Jeferson.      Renata Rivas, PhD

## 2025-04-30 ENCOUNTER — PATIENT OUTREACH (OUTPATIENT)
Dept: CARE COORDINATION | Facility: CLINIC | Age: 20
End: 2025-04-30
Payer: COMMERCIAL

## 2025-04-30 NOTE — PROGRESS NOTES
Clinic Care Coordination Contact  Care Team Conversations    Outreach postponed as patient is currently being followed by JORGE Burgos Intern.     Kay Schneider [facility]  Clinic Care Coordination  [facility]  [email or web address]  [phone]

## 2025-05-07 ENCOUNTER — OFFICE VISIT (OUTPATIENT)
Dept: PSYCHIATRY | Facility: CLINIC | Age: 20
End: 2025-05-07
Attending: SOCIAL WORKER
Payer: COMMERCIAL

## 2025-05-07 DIAGNOSIS — F12.90 CANNABIS USE DISORDER: ICD-10-CM

## 2025-05-07 DIAGNOSIS — F90.2 ADHD (ATTENTION DEFICIT HYPERACTIVITY DISORDER), COMBINED TYPE: ICD-10-CM

## 2025-05-07 DIAGNOSIS — F25.1 SCHIZOAFFECTIVE DISORDER, DEPRESSIVE TYPE (H): Primary | ICD-10-CM

## 2025-05-07 PROCEDURE — 90847 FAMILY PSYTX W/PT 50 MIN: CPT | Performed by: SOCIAL WORKER

## 2025-05-08 ENCOUNTER — VIRTUAL VISIT (OUTPATIENT)
Dept: PSYCHIATRY | Facility: CLINIC | Age: 20
End: 2025-05-08
Attending: STUDENT IN AN ORGANIZED HEALTH CARE EDUCATION/TRAINING PROGRAM
Payer: COMMERCIAL

## 2025-05-08 DIAGNOSIS — T88.7XXA MEDICATION SIDE EFFECTS: ICD-10-CM

## 2025-05-08 DIAGNOSIS — F90.2 ADHD (ATTENTION DEFICIT HYPERACTIVITY DISORDER), COMBINED TYPE: ICD-10-CM

## 2025-05-08 DIAGNOSIS — F12.90 CANNABIS USE DISORDER: ICD-10-CM

## 2025-05-08 DIAGNOSIS — F25.1 SCHIZOAFFECTIVE DISORDER, DEPRESSIVE TYPE (H): ICD-10-CM

## 2025-05-08 PROCEDURE — G2211 COMPLEX E/M VISIT ADD ON: HCPCS | Mod: 95

## 2025-05-08 PROCEDURE — 1126F AMNT PAIN NOTED NONE PRSNT: CPT | Mod: 95

## 2025-05-08 PROCEDURE — 98006 SYNCH AUDIO-VIDEO EST MOD 30: CPT | Mod: GC

## 2025-05-08 ASSESSMENT — ANXIETY QUESTIONNAIRES
GAD7 TOTAL SCORE: 16
1. FEELING NERVOUS, ANXIOUS, OR ON EDGE: NEARLY EVERY DAY
7. FEELING AFRAID AS IF SOMETHING AWFUL MIGHT HAPPEN: MORE THAN HALF THE DAYS
GAD7 TOTAL SCORE: 16
3. WORRYING TOO MUCH ABOUT DIFFERENT THINGS: MORE THAN HALF THE DAYS
GAD7 TOTAL SCORE: 16
5. BEING SO RESTLESS THAT IT IS HARD TO SIT STILL: MORE THAN HALF THE DAYS
4. TROUBLE RELAXING: MORE THAN HALF THE DAYS
8. IF YOU CHECKED OFF ANY PROBLEMS, HOW DIFFICULT HAVE THESE MADE IT FOR YOU TO DO YOUR WORK, TAKE CARE OF THINGS AT HOME, OR GET ALONG WITH OTHER PEOPLE?: VERY DIFFICULT
7. FEELING AFRAID AS IF SOMETHING AWFUL MIGHT HAPPEN: MORE THAN HALF THE DAYS
6. BECOMING EASILY ANNOYED OR IRRITABLE: MORE THAN HALF THE DAYS
2. NOT BEING ABLE TO STOP OR CONTROL WORRYING: NEARLY EVERY DAY
IF YOU CHECKED OFF ANY PROBLEMS ON THIS QUESTIONNAIRE, HOW DIFFICULT HAVE THESE PROBLEMS MADE IT FOR YOU TO DO YOUR WORK, TAKE CARE OF THINGS AT HOME, OR GET ALONG WITH OTHER PEOPLE: VERY DIFFICULT

## 2025-05-08 ASSESSMENT — PATIENT HEALTH QUESTIONNAIRE - PHQ9
SUM OF ALL RESPONSES TO PHQ QUESTIONS 1-9: 15
SUM OF ALL RESPONSES TO PHQ QUESTIONS 1-9: 15
10. IF YOU CHECKED OFF ANY PROBLEMS, HOW DIFFICULT HAVE THESE PROBLEMS MADE IT FOR YOU TO DO YOUR WORK, TAKE CARE OF THINGS AT HOME, OR GET ALONG WITH OTHER PEOPLE: VERY DIFFICULT

## 2025-05-08 ASSESSMENT — PAIN SCALES - GENERAL: PAINLEVEL_OUTOF10: NO PAIN (0)

## 2025-05-08 NOTE — NURSING NOTE
Is the patient currently in the state of [place]? YES    Current patient location: [facility] [address]  [facility] [id number]    Visit mode:Video    If the visit is dropped, the patient can be reconnected by: VIDEO VISIT: Text to cell phone:   Telephone Information:   Mobile [phone]       Will anyone else be joining the visit? No  (If patient encounters technical issues they should call [phone])    Are changes needed to the allergy or medication list? Pt stated no changes to allergies and Pt stated no med changes    Are refills needed on medications prescribed by this physician? No    Rooming Documentation: Questionnaire(s) completed.    Reason for visit: RECHECK     Monica Arenas, VVF

## 2025-05-08 NOTE — PATIENT INSTRUCTIONS
Medications:   -Start taking lexapro in the morning   -Start taking abilify in the morning   -Can take full dose of NAC (2400)  in the morning   -Continue taking strattera in the morning  - can try 10-25 mg hydroxyzine       Substance use group resources:   https://www.mhresources.org/substance-use-disorder     https://www.[employer]/programs/outpatient/     https://namisemn.org/events/category/sari-connection/list/        **For crisis resources, please see the information at the end of this document**   Patient Education    Thank you for coming to the [facility] [place] [facility].     Lab Testing:  If you had lab testing today and your results are reassuring or normal they will be mailed to you or sent through [facility] within 7 days. If the lab tests need quick action we will call you with the results. The phone number we will call with results is # [phone]. If this is not the best number please call our clinic and change the number.     Medication Refills:  If you need any refills please call your pharmacy and they will contact us. Our fax number for refills is [phone].   Three business days of notice are needed for general medication refill requests.   Five business days of notice are needed for controlled substance refill requests.   If you need to change to a different pharmacy, please contact the new pharmacy directly. The new pharmacy will help you get your medications transferred.     Contact Us:  Please call [phone] during business hours (8-5:00 M-F).   If you have medication related questions after clinic hours, or on the weekend, please call [phone].     Financial Assistance [phone]   Medical Records [phone]       MENTAL HEALTH CRISIS RESOURCES:  For a emergency help, please call 911 or go to the nearest Emergency Department.     Emergency Walk-In Options:   [facility] Unit @ [facility] ([place]): [phone] - Specialized mental  health emergency area designed to be calming  Formerly [facility] ([place]): [phone]  [facility] ([place]): [phone]  [facility] ([place]): [phone]    [facility] Crisis Information:   Joel: [phone]  Isaiah: [phone]  Nakia (FABIANA) - Adult: [phone]     Child: [phone]  Yovanny - Adult: [phone]     Child: [phone]  [place]: [phone]  List of all [facility] resources:   https://[place].gov/dhs/people-we-serve/adults/health-care/mental-health/resources/crisis-contacts.jsp    National Crisis Information:   Crisis Text Line: Text  [place]  to 789315  Suicide & Crisis Lifeline: 988  National Suicide Prevention Lifeline: 9-697-493-TALK (1-935.421.3811)       For online chat options, visit https://suicidepreventionlifeline.org/chat/  Poison Control Center: 1-971.475.8445  Trans Lifeline: 1-265.154.2680 - Hotline for transgender people of all ages  [employer]: 1-749-327-7619 - Hotline for LGBT youth     For Non-Emergency Support:   Fast Tracker: Mental Health & Substance Use Disorder Resources -   https://www.[place].org/

## 2025-05-08 NOTE — PROGRESS NOTES
" owns   [facility]  Psychosis Treatment Team  MEDICAL PROGRESS NOTE       CARE TEAM:    PCP: Caroline Moreno  Therapist: Prisca Stallings - [facility]  (Aly Bell)- Leela Phillips is a 20 year old who uses the pronouns she, her, hers, they, them, theirs.     Virtual Visit Details    Type of service:  Video Visit   START:233  STOP:307  Originating Location (pt. 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    Today, Jacqueline reports recently applying for a job independently.  She believes medication adherence is somewhat improved and both she and dad agree that she feels better when taking medications regularly, and regimen is overall optimized.  She is transitioning to have a new PCA, previous PCA was helpful.  While she reports good mood, she does continue to have intermittent SI that is worse during the week and improved on the weekend, this patient did not therapy for that as well as that she is trying to save face, actively having happened in the beginning " systems as a sleep, I started her ability differently because the appointment is not [place] they have been yesterday but denies current safety concerns.  Dad reports that sleep schedule is impacted by different schedule on the weekends, does think this impacts her ability to stay present at school.  Jacqueline reports a preference to lose weight.  Discussed option to change metformin versus eventual referral to weight management.  At this time they would like to hold off on any changes.    Psychotropic Drug Interactions:    ADDITIVE SEROTONERGIC: Abilify, escitalopram  ADDITIVE QTc: Escitalopram, atomoxetine   Management: routine monitoring    [place]NPMP was not checked today: not using controlled substances    Risk Statements:   Treatment Risk: Risks, benefits, alternatives and potential adverse effects have been discussed and are understood.   Safety Risk: Serena did not appear to be an imminent safety risk to self or others.  Future Considerations:  -Continue coordinating care with parents as needed   -Optimizing ADHD medication as needed (atomoxetine)  -Avoid stimulants due to potential for worsening psychosis   -Monitoring Abilify effectiveness as well as long-term use/metabolic side effects.  -Consider referral to weight management clinic if needed   -Once a period of sustained sobriety is achieved, consider obtaining neuropsychological testing to better understand her functioning as well as continue to support her with appropriate resources   -Continue offering/exploring resources for substance use/relapse prevention    https://www.mhresources.org/substance-use-disorder     https://www.[facility].com/programs/outpatient/     https://namisemn.org/events/category/sari-connection/list/        PLAN    1) Medications: no changes to regimen  - Continue atomoxetine 40 mg PO qDay  - Continue escitalopram 20 mg po qDay  - Continue aripiprazole 10 mg PO daily   - Continue metformin  mg qAM (~7:30 am) and 1000 mg (2  tabs) with dinner (8 pm) for a total daily dose of 1500 mg)  -continue NAC supplement up to 1200mg BID     Other:   -light box to promote wakefulness/support mood  -Continue melatonin 1-3 mg PO at bedtime PRN.      2) Psychotherapy: Individual therapy with Prisca Stallings, individual DBT with Dr. Melara weekly    3) Next due:  Labs- Yearly AP labs-updated CBC, CMP, A1C and fasting lipids on 07/18/24  EKG- Routine monitoring is not indicated for current psychotropic medication regimen   Rating scales- AIMS:last score = 0. Next due December 2024. PHQ9, GAD7     4) Referrals: None    5) Follow-up: Return to clinic in 4 weeks                  Interval History   - Updates: works at [employer] at the [employer].   -Mood: depends, most of time good.   -SI much improved, during week at school not calling dad with concerns for self harm.    -Anxiety: there, listening to music helps   -Sleep: dad thinks still struggles with sleep hygiene, she previously used cannabis for relaxation and sleep. Gets ready for bed 9:30-10, listen to music as her only routine. Wake up time 6:30-6:45 and goes to program, comes back between 10:30-noon, then sleeps for 2-3 hours then works. Likes to game at night.   -Psychosis: none   -Substances: wants to be sober  -Therapy: she reports thinking back groups have been interesting and helpful   -Medications:taking meds, does it when comes downstairs or gets reminded, harder one to remember is evening. Right now taking aripiprazole     PCA- in home services started for 3 weeks, just started with a new person. Thinks taking medications daily, PCA will be calling her morning and night to remind her. Will be giving her $15 to incentive brushing teeth/taking meds    Current Social History:  Financial/occupational: in school   Living situation: lives at home with parents  Social/spiritual support: family, friends, medical team      Pertinent Substance Use:  [Last updated 09/05/24]  Alcohol: wants to be sober-    Cannabis: wants to be sober - last Friday last use   Nicotine: Yes: once every other day vaping     Medical Review of Systems / Med sfx:     Was experiencing constipation, stomach issues, diarrhea    Contraception: Yes: getting nexplanon                 Summary Points of Current Care  09/2024: mood continues to be stable, dicussed cannabis use with patient and parents and contraindication with hx of psychosis and mood   10/24: Due to daytime sedation, encouraged her to take shorter naps and regulate sleep cycle, also encouraged to abstain from cannabis  11/24: No medication changes, did request refill of OCP which we will bridge until establishes with new PCP  12/24: No medication changes, discussed strategies for improved adherence, will be getting Nexplanon  1/2025: No medication changes, recommended using [facility] and melatonin a few hours before intended bedtime to help with sleep hygiene  03/2025: no medication changes, consider future weight management referral                Physical Exam  (Vitals Only)    There were no vitals taken for this visit.  Pulse Readings from Last 3 Encounters:   02/19/25 100   02/13/25 81   01/29/25 100     Wt Readings from Last 3 Encounters:   02/19/25 74.9 kg (165 lb 1.6 oz) (90%, Z= 1.26)*   02/13/25 72.9 kg (160 lb 11.2 oz) (87%, Z= 1.15)*   01/29/25 75.3 kg (166 lb) (90%, Z= 1.28)*     * Growth percentiles are based on CDC (Girls, 2-20 Years) data.     BP Readings from Last 3 Encounters:   02/19/25 105/71   02/13/25 117/73   01/29/25 113/77                      Mental Status Exam    Alertness: oriented, drowsy, and sleepy  Appearance: casually groomed  Behavior/Demeanor: cooperative and calm, with good  eye contact   Speech: normal and regular rate and rhythm  Language: intact and no problems  Psychomotor: normal or unremarkable  Mood: description consistent with euthymia  Affect: restricted; congruent to: mood- yes, content- yes  Thought Process/Associations:  unremarkable  Thought Content:  Reports none;  Denies suicidal ideation  Perception:  Reports none;  Denies hallucinations  Insight: good  Judgment: good  Cognition: does  appear grossly intact; formal cognitive testing was not done  Gait and Station: unremarkable                  Past Psychotropic Medication Trials          Medication Max Dose (mg) Dates / Duration Helpful? DC Reason / Adverse Effects?   sertraline           escitalopram 20         buspar           bupropion                       prazosin           hydroxyzine       Not needed/taken   adderall           Atomoxetine  25                     Abilify  10                        Past Medical History     Patient Active Problem List   Diagnosis    Suicidal ideation    Schizoaffective disorder, bipolar type (H)    ADHD (attention deficit hyperactivity disorder), combined type    Anxiety    Cannabis use disorder    Suicide ideation                     Medications     Current Outpatient Medications   Medication Sig Dispense Refill    acetylcysteine (N-ACETYL CYSTEINE) 600 MG CAPS capsule Take 2 capsules (1,200 mg) by mouth 2 times daily. 120 capsule 2    ARIPiprazole (ABILIFY) 10 MG tablet Take 1 tablet (10 mg) by mouth at bedtime. 30 tablet 2    atomoxetine (STRATTERA) 40 MG capsule Take 1 capsule (40 mg) by mouth daily. 30 capsule 2    escitalopram (LEXAPRO) 20 MG tablet Take 1 tablet (20 mg) by mouth daily. 30 tablet 2    etonogestrel (NEXPLANON) 68 MG IMPL 1 each (68 mg) by Subdermal route once.      fluticasone (FLONASE) 50 MCG/ACT nasal spray Spray 1 spray into both nostrils daily. 11.1 mL 0    ibuprofen (ADVIL/MOTRIN) 800 MG tablet Take 1 tablet (800 mg) by mouth every 8 hours as needed for moderate pain. 60 tablet 1    metFORMIN (GLUCOPHAGE XR) 500 MG 24 hr tablet Take 2 tablets (1,000 mg) by mouth daily AND 1 tablet (500 mg) daily (with dinner). For a total daily dose of 1500 mg. 90 tablet 2    naloxone (NARCAN) 4 MG/0.1ML nasal spray Spray 1 spray  (4 mg) into one nostril alternating nostrils as needed for opioid reversal. every 2-3 minutes until assistance arrives (Patient taking differently: Spray 4 mg into one nostril alternating nostrils as needed for opioid reversal. every 2-3 minutes until assistance arrives/ has not used yet) 2 each 3    norgestimate-ethinyl estradiol (ORTHO-CYCLEN) 0.25-35 MG-MCG tablet Take 1 tablet by mouth daily. 30 tablet 1    polyethylene glycol (MIRALAX) 17 GM/Dose powder Take 17 g (1 Capful) by mouth daily. 510 g 0    SUMAtriptan (IMITREX) 25 MG tablet Take 1 tablet (25 mg) by mouth at onset of headache for migraine. 1-2 tablets (25-50 mg) by mouth at onset of headache for migraine. May repeat in 2 hours. Max 8 tablets/24hours 9 tablet 1    triamcinolone (KENALOG) 0.025 % cream Apply topically daily as needed for irritation 80 g 0                     Data         9/17/2024     5:19 PM 1/9/2025     1:45 PM 4/18/2025    10:03 AM   PROMIS-10 Total Score w/o Sub Scores   PROMIS TOTAL - SUBSCORES 26 26  25        Patient-reported         1/13/2025     2:27 PM 1/13/2025     2:28 PM 3/20/2025     3:26 PM   PHQ-9 SCORE   PHQ-9 Total Score MyChart  17 (Moderately severe depression) 10 (Moderate depression)   PHQ-9 Total Score 17   10        Patient-reported         12/10/2024     3:10 PM 1/9/2025     1:44 PM 3/20/2025     3:27 PM   SHIVANI-7 SCORE   Total Score  13 (moderate anxiety) 13 (moderate anxiety)   Total Score 14 13  13        Patient-reported       Liver/Kidney Function, TSH Metabolic Blood counts   Recent Labs   Lab Test 07/18/24  1510 06/24/23  0717   AST 19 21   ALT 18 20   ALKPHOS 68 64   CR 0.75 0.57     Recent Labs   Lab Test 06/24/23  0717   TSH 1.44    Recent Labs   Lab Test 07/18/24  1510   CHOL 202*   TRIG 122*      HDL 71     Recent Labs   Lab Test 07/18/24  1510   A1C 5.0     Recent Labs   Lab Test 07/18/24  1510   GLC 81    Recent Labs   Lab Test 07/18/24  1510   WBC 9.1   HGB 12.5*   HCT 36.9   MCV 84               Level of Medical Decision Making:   - At least 1 chronic problem that is not stable  - Engaged in prescription drug management during visit (discussed any medication benefits, side effects, alternatives, etc.)       The longitudinal plan of care for the diagnosis(es)/condition(s) as documented were addressed during this visit. Due to the added complexity in care, I will continue to support Jacqueline in the subsequent management and with ongoing continuity of care.      PROVIDER: Rosa Govea MD    Patient staffed in clinic with Dr. Geronimo.  Note will be reviewed and signed by Dr. Geronimo

## 2025-05-08 NOTE — Clinical Note
Dmitriy Villanueva!  I was just wondering if you could possibly reach out to Jacqueline/family to see if they were able to find a substance use support group- I gave them the list of resources you gave me but just want to make sure.  Thank you so much

## 2025-05-11 NOTE — PROGRESS NOTES
GEORGE GRANDA  BD. 2005  DX. ADHD, schizoaffective disorder, cannabis use disorder  CODE: 30535 FAMILY THERAPY WITH PATIENT, [place]y  START. 11.30AM  END. 12.30PM  SEEN BY SHARLA BASILIO, PHD    JACQUELINE is seen with hr parents. She is significantly more engaged, animated, coherent in her conversation; no sign of sleepiness or avoidance.  She reports that, on family trip to [place], she snuck out/ somehow knew someone, went to a party, got drunk and lost. Dorita texted her parents, but she managed to find her way to [facility], with help of stranger  who was kind .  This experience seems to have been a potential transformation: she wants to get into drug treatment, wants a group which has helped her in the past, broke up with her boyfriend (maybe her in initiation/ unclear).  She spoke with resolve and also referenced her parents, apologizing and owning that she burdened them.    They remained skeptical-- Jacqueline has tried din the past but usually with others  forcing her. They slipped back into old  crimes  (sneaking out of window) but were able to accept that she hadn t been guilty of this is some time. Witnessed both parents  disbelief but also wonder about how they may unconsciously keep these patterns going: they had a hard time finding positives in her presently narrative.    Impressions and plan.  Jacqueline left the session to make an appointment immediately with Dr. Govea. 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   Plan is to work with Dr. Govea, help her access services over the summer.  We will meet as a family in one month.        Sharla Basilio, PhD

## 2025-05-14 NOTE — PROGRESS NOTES
"  Virtual Visit Details    Type of service:  Video Visit     Originating Location (pt. Location): Home    Distant Location (provider location):  On-site  Platform used for Video Visit: [facility]     Individual Psychotherapy Progress Note    Date: Apr 18, 2025    Patient Name: Lauren Montenegro     Preferred Name: \"Jacqueline\"    Patient Pronouns: She/Her, They/Them    Patient MRN: [id number]    Provider: Daniel Landauer, PhD, LP    Procedure: Individual DBT session    Appointment Duration: 3:20 to 4:00 PM (40 minutes)    People Present: Jacqueline and Dr. Landauer    Diagnosis:   Encounter Diagnoses   Name Primary?    Schizoaffective disorder, depressive type (H) Yes    ADHD (attention deficit hyperactivity disorder), combined type     Cannabis use disorder     Tobacco use disorder     Generalized anxiety disorder          Treatment Plan                                                                                              Problem 1: Jacqueline has significant difficulty regulating strong emotions and impulses effectively. This difficulty has contributed to history of suicidal ideation, multiple suicide attempts, self-harm behavior, risk-taking behaviors (including substance use), angry outbursts, and interpersonal relationship challenges.     Goal Jacqueline will demonstrate at least a 75% decrease in frequency and intensity of SIB and SI and will learn and utilize effectively alternative emotion regulation, distress tolerance, and impulse control strategies 75% of the time when needed prior to discharge..   Intervention Jacqueline will participate in full-model DBT including individual and family therapy and skills group in order to develop appropriate and effective emotion regulation, distress tolerance and impulse control skills.   Progress: Some progress. Jacqueline has demonstrated some overall improvement in emotion regulation and distress tolerance skills. There has been a " "significant decrease in frequency and intensity of suicidal ideation. Though she continues to experience some urges to self-harm, it has been several months since last engagement in SIB.  She still struggles with impulse control when emotions are intense, especially related to substance use and making risky/unsafe choices.  Target Date: 4/1/2025     Problem 2: Jacqueline reports experiencing worsening symptoms of depression and anxiety. These symptoms have contributed to and are exacerbated by interpersonal problems, academic problems, low self-esteem, and use of ineffective coping skills.     Goal \"I want to be happy, not looking at all negatives.\"     Jacqueline will demonstrate an increase in behavioral activation and effective coping by engaging in family and/or peer activities at least 2x per week, engaging in at least one pleasant or success activity per day, and use of coping skills in stressful situations at least 75% of the time..   Intervention Jacqueline will participate in full-model DBT including individual and family therapy and skills group in order to increase behavioral activation, develop stress management skills, improve mood, improve self-esteem, and manage anxiety.   Progress: Some progress. Jacqueline' mood has fluctuated since starting therapy. More recently she has indicated feeling more depressed and stressed and having increases in negative self-talk and decreases in self-esteem. She often worries that she is a burden on her family because of her mental health and substance use issues and feeling like she is always messing things up. She can experience dawood and has demonstrated overall improvement in motivation. She continues to struggle with sticking to plans to  improve healthy habits.  Target Date: 4/1/2025     Problem 3: Jacqueline has an extensive history of substance use and abuse, which has contributed to significant  interpersonal stress, academic issues, and depression and anxiety symptoms. She has had " "difficulty with maintaining sobriety despite these consequences. She has indicated little motivation to stay sober from Marijuana     Goal \"I want skills not to relapse.\"  Jacqueline will refrain from alcohol and drug use. She will learn and utilize alternative coping skills to substance use and will develop efficacy with refusing substances in peer situations, such that she is able to maintain 100% sobriety from alcohol, marijuana, and other drugs by the end of DBT programming   Intervention Jacqueline will participate in full-model DBT including individual and family therapy and skills group in order to develop effective distress tolerance and impulse control skills, so that she can resist urges to use substances, increase ability to make Wise Mind decisions about substance use, and increase awareness of factors that contribute to urges to use substances.   Progress: Intermittent progress. Jacqueline's dedication sobriety has waned over the last several months and she no longer wants to stay sober from marijuana. She continues to deny desire to use any other substances and states that she plans to stay clean from other drugs. She believes that she is able to regulate her marijuana use and not be dependent on it, though it is not clear if she is actually capable of only using marijuana recreationally.   Target Date: 4/1/2025     Treatment Plan Completed: 10/06/2023  Treatment Plan Updated/Reviewed: 2/14/2025  Treatment Plan Review Due: 5/01/2025  Session Content                                                                                               Subjective:  Jacqueline reported that she started her new job and she thinks that it is going well so far. She stated that she and her boyfriend are on a \"break\" right now. She noted that she instated the break and her boyfriend was okay with it at first, but now he wants to the break to end sooner than they agreed. She indicated a need for a break to determine if the relationship is " healthy for her. She often feels like her boyfriend does not give her the attention and affection that she thinks she needs. At the same time, however, she questions if her desire for attention and affection is reasonable to expect. She noted that she is still struggling with sleeping. She started trying to eat more healthily, but she has not been as successful with that recently. She acknowledged that she continues to experience challenges with school. She stated that she is bored and uncomfortable, so her first instinct to try to avoid going to school or getting out early.      Objective/Intervention:   Clinician utilized a DBT approach during the session. Clinician and Jacqueline reviewed the events since the last session. Clinician assessed for safety issues and suicidal ideation. Clinician and Jacqueline addressed her thoughts and feelings related to her relationship with her boyfriend. Clinician and [place] also reviewed her successes and challenges with engaging in healthy habits and explored ways to get her back on track. Finally, Clinician and Jacqueline addressed her school challenges.    Assessment:   Jacqueline  presented as alert and engaged today. She was late to the appointment because she forgot she had it. She has had some ups and downs, but it is encouraging that she is sticking with her new job and that she is being more assertive in her romantic relationship. She continues to talk about lack of self-esteem and having a negative body image and it has been hard for to be consistent with maintaining healthy habits that might both serve to boost mood and her self-esteem.    Primary Targets Addressed in This Session:  Life-Threatening Behavior: None; denied suicidal ideation and self-harm urges  Therapy Interfering Behavior: late for appointment  Quality of Life: Interpersonal relationships, family issues, Healthy habits, self-esteem, Substance use    DBT Skills reviewed or taught in Session:    ABC PLEASE skills,  ACCEPTS/Self-soothe, Opposite Action, Pros and Cons, checking the facts, STOP, DEAR MAN    Patient Used Phone Coaching Since Last Session: No     Chain Analysis/Solution Analysis? No    Behavioral Assignments:  1) Utilize learned DBT Skills  2) Engage in daily exercise, eat 3 meals a day, practice good sleep hygiene.     Plan: Patient will continue in individual DBT until she is able to transition to another therapist for longer-term care.    Mental Status                                                                                                Behavioral Observations/Mental Status: Patient arrived on time to session. Patient was adequately groomed. Eye contact was adequate. Mood today was pleasant and sad. Observed affect was full range. Speech was regular rate and rhythm. Thought process was Logical and Linear. Patient was actively engaged in session.  Risk Assessment:     See above for current review of risk.    Jacqueline has a long history dating back to early adolescence of suicidal ideation, self-harm behavior, and multiple suicide attempts. Most recent suicide attempt occurred in June 2023 while she was in residential substance use treatment. This incident was triggered by a combination of being bullied by other residents and frustration with not being able to go home. Previous suicide attempts have been of an impulsive nature and are usually triggered by a specific event. She has tried hanging herself 3 times and drowning herself 1 time. She last engaged in self-injury about a month prior to this assessment. At the time of the assessment she is denying experiencing active suicidal thoughts and denying significant urges to self-injure. She denied  experiencing passive suicidal ideation in recent weeks and denied any in the last week.     At the time of the assessment, Jacqueline identifies several reasons for living and demonstrates future-oriented thinking. She indicated that she is confident that she will not  attempt to kill herself again, especially if she is able to stay in outpatient care. She identifies family and one friend as sources of social support who she can reach out to if she needs help or a distraction. She is able to identify other distractions and coping skills she can use if ideation gets more intense.     Based on risk and protective factors, patient is assessed to be at a Low Acute Risk (i.e., no current suicidal intent, specific plan, preparatory behaviors, and high confidence in patient's ability to maintain safety). She is likely at intermediate chronic risk for suicidal behavior given her challenges with emotion regulation and impulse control.    Daniel Landauer, PhD, LP

## 2025-05-16 ENCOUNTER — VIRTUAL VISIT (OUTPATIENT)
Dept: PSYCHIATRY | Facility: CLINIC | Age: 20
End: 2025-05-16
Payer: COMMERCIAL

## 2025-05-16 DIAGNOSIS — F90.2 ADHD (ATTENTION DEFICIT HYPERACTIVITY DISORDER), COMBINED TYPE: ICD-10-CM

## 2025-05-16 DIAGNOSIS — F25.1 SCHIZOAFFECTIVE DISORDER, DEPRESSIVE TYPE (H): Primary | ICD-10-CM

## 2025-05-16 DIAGNOSIS — F17.200 TOBACCO USE DISORDER: ICD-10-CM

## 2025-05-16 DIAGNOSIS — F12.90 CANNABIS USE DISORDER: ICD-10-CM

## 2025-05-16 DIAGNOSIS — F41.1 GENERALIZED ANXIETY DISORDER: ICD-10-CM

## 2025-05-16 PROCEDURE — 90834 PSYTX W PT 45 MINUTES: CPT | Mod: 95 | Performed by: PSYCHOLOGIST

## 2025-05-16 RX ORDER — ATOMOXETINE 40 MG/1
40 CAPSULE ORAL EVERY MORNING
Qty: 30 CAPSULE | Refills: 2 | Status: SHIPPED | OUTPATIENT
Start: 2025-05-16

## 2025-05-16 RX ORDER — METFORMIN HYDROCHLORIDE 500 MG/1
TABLET, EXTENDED RELEASE ORAL
Qty: 90 TABLET | Refills: 2 | Status: SHIPPED | OUTPATIENT
Start: 2025-05-16

## 2025-05-16 RX ORDER — ARIPIPRAZOLE 10 MG/1
10 TABLET ORAL EVERY MORNING
Qty: 30 TABLET | Refills: 2 | Status: SHIPPED | OUTPATIENT
Start: 2025-05-16

## 2025-05-16 RX ORDER — ESCITALOPRAM OXALATE 20 MG/1
20 TABLET ORAL EVERY MORNING
Qty: 30 TABLET | Refills: 2 | Status: SHIPPED | OUTPATIENT
Start: 2025-05-16

## 2025-05-16 NOTE — PROGRESS NOTES
"  Virtual Visit Details    Type of service:  Video Visit     Originating Location (pt. Location): Home    Distant Location (provider location):  On-site  Platform used for Video Visit: [facility]     Individual Psychotherapy Progress Note    Date: May 16, 2025    Patient Name: Lauren Montenegro     Preferred Name: \"Jacqueline\"    Patient Pronouns: She/Her, They/Them    Patient MRN: [id number]    Provider: Daniel Landauer, PhD, LP    Procedure: Individual DBT session    Appointment Duration: 3:00 to 3:50 PM (50 minutes)    People Present: Jacqueline and Dr. Landauer    Diagnosis:   Encounter Diagnoses   Name Primary?    Schizoaffective disorder, depressive type (H) Yes    ADHD (attention deficit hyperactivity disorder), combined type     Cannabis use disorder     Generalized anxiety disorder     Tobacco use disorder            Treatment Plan                                                                                              Problem 1: Jacqueline has significant difficulty regulating strong emotions and impulses effectively. This difficulty has contributed to history of suicidal ideation, multiple suicide attempts, self-harm behavior, risk-taking behaviors (including substance use), angry outbursts, and interpersonal relationship challenges.     Goal Jacqueline will demonstrate at least a 75% decrease in frequency and intensity of SIB and SI and will learn and utilize effectively alternative emotion regulation, distress tolerance, and impulse control strategies 75% of the time when needed prior to discharge..   Intervention Jacqueline will participate in full-model DBT including individual and family therapy and skills group in order to develop appropriate and effective emotion regulation, distress tolerance and impulse control skills.   Progress: Some progress. Jacqueline has demonstrated some overall improvement in emotion regulation and distress tolerance skills. There has been " "a significant decrease in frequency and intensity of suicidal ideation. Though she continues to experience some urges to self-harm, it has been several months since last engagement in SIB.  She still struggles with impulse control when emotions are intense, especially related to substance use and making risky/unsafe choices.  Target Date: 4/1/2025     Problem 2: Jacqueline reports experiencing worsening symptoms of depression and anxiety. These symptoms have contributed to and are exacerbated by interpersonal problems, academic problems, low self-esteem, and use of ineffective coping skills.     Goal \"I want to be happy, not looking at all negatives.\"     Jacqueline will demonstrate an increase in behavioral activation and effective coping by engaging in family and/or peer activities at least 2x per week, engaging in at least one pleasant or success activity per day, and use of coping skills in stressful situations at least 75% of the time..   Intervention Jacqueline will participate in full-model DBT including individual and family therapy and skills group in order to increase behavioral activation, develop stress management skills, improve mood, improve self-esteem, and manage anxiety.   Progress: Some progress. Jacqueline' mood has fluctuated since starting therapy. More recently she has indicated feeling more depressed and stressed and having increases in negative self-talk and decreases in self-esteem. She often worries that she is a burden on her family because of her mental health and substance use issues and feeling like she is always messing things up. She can experience dawood and has demonstrated overall improvement in motivation. She continues to struggle with sticking to plans to  improve healthy habits.  Target Date: 4/1/2025     Problem 3: Jacqueline has an extensive history of substance use and abuse, which has contributed to significant  interpersonal stress, academic issues, and depression and anxiety symptoms. She has had " "difficulty with maintaining sobriety despite these consequences. She has indicated little motivation to stay sober from Marijuana     Goal \"I want skills not to relapse.\"  Jacqueline will refrain from alcohol and drug use. She will learn and utilize alternative coping skills to substance use and will develop efficacy with refusing substances in peer situations, such that she is able to maintain 100% sobriety from alcohol, marijuana, and other drugs by the end of DBT programming   Intervention Jacqueline will participate in full-model DBT including individual and family therapy and skills group in order to develop effective distress tolerance and impulse control skills, so that she can resist urges to use substances, increase ability to make Wise Mind decisions about substance use, and increase awareness of factors that contribute to urges to use substances.   Progress: Intermittent progress. Jacqueline's dedication sobriety has waned over the last several months and she no longer wants to stay sober from marijuana. She continues to deny desire to use any other substances and states that she plans to stay clean from other drugs. She believes that she is able to regulate her marijuana use and not be dependent on it, though it is not clear if she is actually capable of only using marijuana recreationally.   Target Date: 4/1/2025     Treatment Plan Completed: 10/06/2023  Treatment Plan Updated/Reviewed: 2/14/2025  Treatment Plan Review Due: 5/01/2025  Session Content                                                                                               Subjective:  Jacqueline reported that she and her boyfriend broke up recently. She stated that they are still friends with benefits, but acknowledging that things are weird between them. She is not sure what she wants with regard to a relationship with him. She shared that she got in trouble with her parents during a recent trip to [place]. She stated that she snuck out to meet a manjit " that she met on Tinder and parents found out. She acknowledged that she was high when she did that. Parents have told her that she can't smoke weed anymore. She stated that she  is 2 weeks clean.      Objective/Intervention:   Clinician utilized a DBT approach during the session. Clinician and [place] reviewed the events since the last session. Clinician assessed for safety issues and suicidal ideation. Clinician and Jacqueline addressed her break-up and current relationship with her ex-boyfriend. Clinician and [place] also addressed the incident in [place]. Clinician and [place] completed a mini-chain analysis of the situation. Clinician and Jacqueline reviewed how substances have contributed to impaired decision making in the last few months.     Assessment:   Jacqueline  presented as alert and engaged today. She was reflective about her relationship with her ex and she was able to acknowledge that she does not really want to be friends with benefits, but does not know what she actually does want. She was able to acknowledge and take responsibility for her behavior in [place]. She recognized the role weed played in her decision making.    Primary Targets Addressed in This Session:  Life-Threatening Behavior: None; denied suicidal ideation and self-harm urges  Therapy Interfering Behavior: none  Quality of Life: Interpersonal relationships, family issues, Healthy habits, self-esteem, Substance use    DBT Skills reviewed or taught in Session:    ABC PLEASE skills, ACCEPTS/Self-soothe, Opposite Action, Pros and Cons, checking the facts, STOP, DEAR MAN    Patient Used Phone Coaching Since Last Session: No     Chain Analysis/Solution Analysis? No    Behavioral Assignments:  1) Utilize learned DBT Skills  2) Engage in daily exercise, eat 3 meals a day, practice good sleep hygiene.     Plan: Patient will continue in individual DBT until she is able to transition to another therapist for longer-term care.    Mental Status                                                                                                 Behavioral Observations/Mental Status: Patient arrived on time to session. Patient was adequately groomed. Eye contact was adequate. Mood today was pleasant and sad. Observed affect was full range. Speech was regular rate and rhythm. Thought process was Logical and Linear. Patient was actively engaged in session.  Risk Assessment:     See above for current review of risk.    Jacqueline has a long history dating back to early adolescence of suicidal ideation, self-harm behavior, and multiple suicide attempts. Most recent suicide attempt occurred in June 2023 while she was in residential substance use treatment. This incident was triggered by a combination of being bullied by other residents and frustration with not being able to go home. Previous suicide attempts have been of an impulsive nature and are usually triggered by a specific event. She has tried hanging herself 3 times and drowning herself 1 time. She last engaged in self-injury about a month prior to this assessment. At the time of the assessment she is denying experiencing active suicidal thoughts and denying significant urges to self-injure. She denied  experiencing passive suicidal ideation in recent weeks and denied any in the last week.     At the time of the assessment, Jacqueline identifies several reasons for living and demonstrates future-oriented thinking. She indicated that she is confident that she will not attempt to kill herself again, especially if she is able to stay in outpatient care. She identifies family and one friend as sources of social support who she can reach out to if she needs help or a distraction. She is able to identify other distractions and coping skills she can use if ideation gets more intense.     Based on risk and protective factors, patient is assessed to be at a Low Acute Risk (i.e., no current suicidal intent, specific plan, preparatory behaviors, and  high confidence in patient's ability to maintain safety). She is likely at intermediate chronic risk for suicidal behavior given her challenges with emotion regulation and impulse control.    Daniel Landauer, PhD, LP

## 2025-05-16 NOTE — NURSING NOTE
Current patient location: [address] [facility] [address]  [facility] [address]    Is the patient currently in the state of [place]? YES    Visit mode: VIDEO    If the visit is dropped, the patient can be reconnected by:VIDEO VISIT: Text to cell phone:   Telephone Information:   Mobile [phone]       Will anyone else be joining the visit? NO  (If patient encounters technical issues they should call [phone])    Are changes needed to the allergy or medication list? N/A    Are refills needed on medications prescribed by this physician? NO    Rooming Documentation:  Questionnaire(s) not pre-assigned    Reason for visit: RECHECK    Lisa RUSSELLF

## 2025-05-20 ENCOUNTER — PATIENT OUTREACH (OUTPATIENT)
Dept: CARE COORDINATION | Facility: CLINIC | Age: 20
End: 2025-05-20
Payer: COMMERCIAL

## 2025-05-20 ASSESSMENT — ACTIVITIES OF DAILY LIVING (ADL): DEPENDENT_IADLS:: INDEPENDENT

## 2025-05-20 NOTE — PROGRESS NOTES
Clinic Care Coordination Contact  Follow Up Progress Note      Assessment: [facility] contacted patient to follow up on referral placed by Dr. Govea. Patient expressed interest in substance use support groups. She requested that I provide these resources to she and her dad, [place], via email. She would prefer virtual groups. She denies having any further social service needs at this time.    Care Gaps:    Health Maintenance Due   Topic Date Due    DEPRESSION ACTION PLAN  Never done    YEARLY PREVENTIVE VISIT  Never done    Pneumococcal Vaccine: Pediatrics (0 to 5 Years) and At-Risk Patients (6 to 49 Years) (1 of 2 - PCV) 02/28/2024    MENINGITIS B IMMUNIZATION (2 of 2 - Bexsero SCDM 2-dose series) 11/20/2024     Intervention/Education provided during outreach: Patient verbalized understanding, engaged in AIDET communication during patient encounter.     Outreach Frequency: monthly, more frequently as needed    The patient consented via Verbal consent to have contact information and resources sent via email in an unencrypted manner.    Plan: Patient was encouraged to reach out with any questions or concerns.    Care Coordinator will follow up in one month.    Kay Schneider, [facility]  Clinic Care Coordination  [facility]  [email or web address]  [phone]

## 2025-06-19 ENCOUNTER — PATIENT OUTREACH (OUTPATIENT)
Dept: CARE COORDINATION | Facility: CLINIC | Age: 20
End: 2025-06-19
Payer: COMMERCIAL

## 2025-06-19 NOTE — PROGRESS NOTES
Clinic Care Coordination Contact  [facility] [place]/Voicemail    Clinical Data: Care Coordinator Outreach    Outreach Documentation Number of Outreach Attempt   6/19/2025  10:10 AM 1       Left message on patient's voicemail with call back information and requested return call.      Plan: Care Coordinator will try to reach patient again in 1 month.    CHIN Deng  Clinic Care Coordination  [facility]  [email or web address]  [phone]

## 2025-06-20 ENCOUNTER — VIRTUAL VISIT (OUTPATIENT)
Dept: PSYCHIATRY | Facility: CLINIC | Age: 20
End: 2025-06-20
Attending: SOCIAL WORKER
Payer: COMMERCIAL

## 2025-06-20 ENCOUNTER — VIRTUAL VISIT (OUTPATIENT)
Dept: PSYCHIATRY | Facility: CLINIC | Age: 20
End: 2025-06-20
Payer: COMMERCIAL

## 2025-06-20 DIAGNOSIS — F17.200 TOBACCO USE DISORDER: ICD-10-CM

## 2025-06-20 DIAGNOSIS — F25.1 SCHIZOAFFECTIVE DISORDER, DEPRESSIVE TYPE (H): Primary | ICD-10-CM

## 2025-06-20 DIAGNOSIS — F41.1 GENERALIZED ANXIETY DISORDER: ICD-10-CM

## 2025-06-20 DIAGNOSIS — F12.90 CANNABIS USE DISORDER: ICD-10-CM

## 2025-06-20 DIAGNOSIS — F90.2 ADHD (ATTENTION DEFICIT HYPERACTIVITY DISORDER), COMBINED TYPE: ICD-10-CM

## 2025-06-20 PROCEDURE — 90847 FAMILY PSYTX W/PT 50 MIN: CPT | Mod: 95 | Performed by: SOCIAL WORKER

## 2025-06-20 PROCEDURE — 90834 PSYTX W PT 45 MINUTES: CPT | Mod: 95 | Performed by: PSYCHOLOGIST

## 2025-06-20 ASSESSMENT — ANXIETY QUESTIONNAIRES
2. NOT BEING ABLE TO STOP OR CONTROL WORRYING: MORE THAN HALF THE DAYS
5. BEING SO RESTLESS THAT IT IS HARD TO SIT STILL: NEARLY EVERY DAY
GAD7 TOTAL SCORE: 15
GAD7 TOTAL SCORE: 15
6. BECOMING EASILY ANNOYED OR IRRITABLE: MORE THAN HALF THE DAYS
7. FEELING AFRAID AS IF SOMETHING AWFUL MIGHT HAPPEN: MORE THAN HALF THE DAYS
IF YOU CHECKED OFF ANY PROBLEMS ON THIS QUESTIONNAIRE, HOW DIFFICULT HAVE THESE PROBLEMS MADE IT FOR YOU TO DO YOUR WORK, TAKE CARE OF THINGS AT HOME, OR GET ALONG WITH OTHER PEOPLE: SOMEWHAT DIFFICULT
GAD7 TOTAL SCORE: 15
7. FEELING AFRAID AS IF SOMETHING AWFUL MIGHT HAPPEN: MORE THAN HALF THE DAYS
1. FEELING NERVOUS, ANXIOUS, OR ON EDGE: NEARLY EVERY DAY
8. IF YOU CHECKED OFF ANY PROBLEMS, HOW DIFFICULT HAVE THESE MADE IT FOR YOU TO DO YOUR WORK, TAKE CARE OF THINGS AT HOME, OR GET ALONG WITH OTHER PEOPLE?: SOMEWHAT DIFFICULT
4. TROUBLE RELAXING: SEVERAL DAYS
3. WORRYING TOO MUCH ABOUT DIFFERENT THINGS: MORE THAN HALF THE DAYS

## 2025-06-20 NOTE — NURSING NOTE
Current patient location: [address]  [place] [facility] [address]    Is the patient currently in the state of [place]? YES    Visit mode: VIDEO    If the visit is dropped, the patient can be reconnected by:VIDEO VISIT: Text to cell phone:   Telephone Information:   Mobile [phone]       Will anyone else be joining the visit? NO  (If patient encounters technical issues they should call [phone]    Are changes needed to the allergy or medication list? No    Are refills needed on medications prescribed by this physician? NO    Rooming Documentation:  Questionnaire(s) completed    Reason for visit: RECHECK    Monique SOTELO

## 2025-06-20 NOTE — PROGRESS NOTES
"  Virtual Visit Details    Type of service:  Video Visit     Originating Location (pt. Location): Home    Distant Location (provider location):  On-site  Platform used for Video Visit: [facility]     Individual Psychotherapy Progress Note    Date: Jun 20, 2025    Patient Name: Lauren Montenegro     Preferred Name: \"Jacqueline\"    Patient Pronouns: She/Her, They/Them    Patient MRN: [id number]    Provider: Daniel Landauer, PhD, LP    Procedure: Individual DBT session    Appointment Duration: 3:00 to 3:40 PM (40 minutes)    People Present: Jacqueline and Dr. Landauer    Diagnosis:   Encounter Diagnoses   Name Primary?    Schizoaffective disorder, depressive type (H) Yes    ADHD (attention deficit hyperactivity disorder), combined type     Cannabis use disorder     Tobacco use disorder     Generalized anxiety disorder        Treatment Plan                                                                                              Problem 1: Jacqueline has significant difficulty regulating strong emotions and impulses effectively. This difficulty has contributed to history of suicidal ideation, multiple suicide attempts, self-harm behavior, risk-taking behaviors (including substance use), angry outbursts, and interpersonal relationship challenges.     Goal Jacqueline will demonstrate at least a 75% decrease in frequency and intensity of SIB and SI and will learn and utilize effectively alternative emotion regulation, distress tolerance, and impulse control strategies 75% of the time when needed prior to discharge..   Intervention Jacqueline will participate in full-model DBT including individual and family therapy and skills group in order to develop appropriate and effective emotion regulation, distress tolerance and impulse control skills.   Progress: Some progress. Jacqueline has demonstrated some overall improvement in emotion regulation and distress tolerance skills. There has been a " "significant decrease in frequency and intensity of suicidal ideation. Though she continues to experience some urges to self-harm, it has been several months since last engagement in SIB.  She still struggles with impulse control when emotions are intense, especially related to substance use and making risky/unsafe choices.  Target Date: 4/1/2025     Problem 2: Jacqueline reports experiencing worsening symptoms of depression and anxiety. These symptoms have contributed to and are exacerbated by interpersonal problems, academic problems, low self-esteem, and use of ineffective coping skills.     Goal \"I want to be happy, not looking at all negatives.\"     Jacqueline will demonstrate an increase in behavioral activation and effective coping by engaging in family and/or peer activities at least 2x per week, engaging in at least one pleasant or success activity per day, and use of coping skills in stressful situations at least 75% of the time..   Intervention Jacqueline will participate in full-model DBT including individual and family therapy and skills group in order to increase behavioral activation, develop stress management skills, improve mood, improve self-esteem, and manage anxiety.   Progress: Some progress. Jacqueline' mood has fluctuated since starting therapy. More recently she has indicated feeling more depressed and stressed and having increases in negative self-talk and decreases in self-esteem. She often worries that she is a burden on her family because of her mental health and substance use issues and feeling like she is always messing things up. She can experience dawood and has demonstrated overall improvement in motivation. She continues to struggle with sticking to plans to  improve healthy habits.  Target Date: 4/1/2025     Problem 3: Jacqueline has an extensive history of substance use and abuse, which has contributed to significant  interpersonal stress, academic issues, and depression and anxiety symptoms. She has had " "difficulty with maintaining sobriety despite these consequences. She has indicated little motivation to stay sober from Marijuana     Goal \"I want skills not to relapse.\"  Jacqueline will refrain from alcohol and drug use. She will learn and utilize alternative coping skills to substance use and will develop efficacy with refusing substances in peer situations, such that she is able to maintain 100% sobriety from alcohol, marijuana, and other drugs by the end of DBT programming   Intervention Jacqueline will participate in full-model DBT including individual and family therapy and skills group in order to develop effective distress tolerance and impulse control skills, so that she can resist urges to use substances, increase ability to make Wise Mind decisions about substance use, and increase awareness of factors that contribute to urges to use substances.   Progress: Intermittent progress. Jacqueline's dedication sobriety has waned over the last several months and she no longer wants to stay sober from marijuana. She continues to deny desire to use any other substances and states that she plans to stay clean from other drugs. She believes that she is able to regulate her marijuana use and not be dependent on it, though it is not clear if she is actually capable of only using marijuana recreationally.   Target Date: 4/1/2025     Treatment Plan Completed: 10/06/2023  Treatment Plan Updated/Reviewed: 2/14/2025  Treatment Plan Review Due: 5/01/2025  Session Content                                                                                               Subjective:  Jacqueline reported that she and her ex are still friends with benefits, though she acknowledged that arrangement is still weird and confusing. She indicated that the hardest part of moving on from the relationship is having to cut ties with his family, who she gets along well with (she just attended his brother's wedding). Jacqueline also shared that things have been rough " with her parents. She stated that her parents let her stay at home by herself for a couple of nights. One of those nights she went out with a male friend and ended up falling asleep in his car. When parents tried to contact her she did not answer and parents put in a missing persons report. Parents were angry and felt like she broke their trust after giving her an opportunity to be responsible. Jacqueline acknowledged that she made a mistake and is frustrated with herself as she keeps making choices like this that lead to set backs in her relationship with parents and her progress toward being more independent. She expressed anxiety about her family therapy session later today.    Objective/Intervention:   Clinician utilized a DBT approach during the session. Clinician and [place] reviewed the events since the last session. Clinician assessed for safety issues and suicidal ideation. Clinician and Jacqueline addressed the incident that led to the missing persons report. Clinician and Jacqueline processed her struggles with making good choices when given the opportunity to demonstrate responsibility and trustworthiness. Clinician and Jacqueline also explored the current dynamic between her and her ex-boyfriend and discussed what she actually wants out of the current relationship and whether it is a healthy situation for her to be in right now.    Assessment:   Jacqueline  presented as alert and engaged today. She was able to acknowledge that there have been times recently when she has avoided coming to therapy because therapy can be exhausting for her. She agreed that having virtual appointments is more convenient, but also gives her more opportunity to avoid coming to session. She asserted that she does want to continue therapy. Based on her report, she does want to make changes and is struggling to figure out why she tends to engage in self-sabotage. She was able to recognize that role substances can play in her decision-making.    Primary  Targets Addressed in This Session:  Life-Threatening Behavior: None; denied suicidal ideation and self-harm urges  Therapy Interfering Behavior: Avoiding therapy  Quality of Life: Interpersonal relationships, family issues, Healthy habits, self-esteem, Substance use    DBT Skills reviewed or taught in Session:    ABC PLEASE skills, ACCEPTS/Self-soothe, Opposite Action, Pros and Cons, checking the facts, STOP, DEAR MAN    Patient Used Phone Coaching Since Last Session: No     Chain Analysis/Solution Analysis? No    Behavioral Assignments:  1) Utilize learned DBT Skills  2) Engage in daily exercise, eat 3 meals a day, practice good sleep hygiene.     Plan: Patient will continue in individual DBT until she is able to transition to another therapist for longer-term care.    Mental Status                                                                                                Behavioral Observations/Mental Status: Patient arrived on time to session. Patient was adequately groomed. Eye contact was adequate. Mood today was pleasant and stressed. Observed affect was full range. Speech was regular rate and rhythm. Thought process was Logical and Linear. Patient was actively engaged in session.  Risk Assessment:     See above for current review of risk.    Jacqueline has a long history dating back to early adolescence of suicidal ideation, self-harm behavior, and multiple suicide attempts. Most recent suicide attempt occurred in June 2023 while she was in residential substance use treatment. This incident was triggered by a combination of being bullied by other residents and frustration with not being able to go home. Previous suicide attempts have been of an impulsive nature and are usually triggered by a specific event. She has tried hanging herself 3 times and drowning herself 1 time. She last engaged in self-injury about a month prior to this assessment. At the time of the assessment she is denying experiencing active  suicidal thoughts and denying significant urges to self-injure. She denied  experiencing passive suicidal ideation in recent weeks and denied any in the last week.     At the time of the assessment, Jacqueline identifies several reasons for living and demonstrates future-oriented thinking. She indicated that she is confident that she will not attempt to kill herself again, especially if she is able to stay in outpatient care. She identifies family and one friend as sources of social support who she can reach out to if she needs help or a distraction. She is able to identify other distractions and coping skills she can use if ideation gets more intense.     Based on risk and protective factors, patient is assessed to be at a Low Acute Risk (i.e., no current suicidal intent, specific plan, preparatory behaviors, and high confidence in patient's ability to maintain safety). She is likely at intermediate chronic risk for suicidal behavior given her challenges with emotion regulation and impulse control.    Daniel Landauer, PhD, LP

## 2025-06-24 NOTE — PROGRESS NOTES
GEORGE GRANDA  BD. 2005  DX. SCHIZOAFFECTIVE DISORDER, ADHD, CANNABIS USE DISORDER  CODE. 67231 FAMILY THERAPY WITH PATIENT;   TELEHEALTH VIRTUAL; PATIENT S REQUEST;   ORIGINATING LOCATION: PATIENT S HOME;   PROVIDER LOCATION: OFF-SITE  MODE: ZOOM  START TIME. 4PM  END TIME. 5PM  SEEN BY RENATA BASILIO, PHD     Jacqueline and her parents are sewn. Mother has recently had surgery so earlier appt changed. Jacqueline seems disconnected, struggling to engage/ very different from her prior appointment when she was self motivated, eager to start CD treatment.  Parents reports an alarming incident --Jacqueline went with an acquaintance, not known to parents, out long beyond curfew, parents contacted police and several municipalities searching for her, found and OK but parents upset.  Tried to connect this time with earlier time in [place] when Jacqueline realized she had been risky/ she seemed more apathetic, annoyed with parents for their reactions.  She did report using backstitch services. Cleaning her room, keeping food contained etc.  Jacqueline reports limited times work in but wanting to protect job/ also not  with  boyfriend but spends a lot of time with him.   Checked with parents: they had not pursued referrals, saying they were occupied with guardianship etc.      Impression and plan Some obvious tension, parents felt criticized/ but they lost her momentum.  Tried to demonstrate why we need to make plans WITH her vs. to her.  Jacqueline is so lacking in energy-- again.  Plan to meet alone with parents next week to better understand dynamics that keep them frustrated, and Jacqueline reactive instead of active.  Use does seem less so this is positive.    Renata Basilio, PhD

## 2025-06-27 NOTE — PROGRESS NOTES
[facility]  Psychiatry Clinic  First Episode of Psychosis - Strengths Program  Clinician Contact & Progress Note   For Family Education Program     Patient: Lauren Montenegro (2005)     MRN: [id number]  Diagnosis(es): Psychosis, unspecified psychosis type (H) [F29]  Clinician: Humberto Wells  Service Type: 29764 Family Therapy without patient  Prolonged Care for this visit is not indicated.  Clinical work consists of family therapy.     Date:  3/22/23    Video- Visit Details   Type of service:  video visit  Video start time: 6:22pm  Video end time: 7:10pm  Originating location (patient location):  [facility]   Location- Home  Distant Site (provider location): HIPAA compliant location Off-site  Platform used for video visit:  Secure real time interactive audio and visual telecommunication system via [email or web address]    People present:   Family without patient present  Mother - Mandie  Father - Orlando Wells     Intervention:  Motivational Interviewing   Connect info and skills with personal goals  Promote hope and positive expectations  Explore pros and cons of change  Re-frame experiences in positive light    Educational Teaching Strategies   Review of written material/education  Relate information to client's experience  Ask questions to check comprehension  Adopt client's language     CBT   Behavioral Experiments (engage in a  what if  consideration, test existing beliefs  and/or help  test more adaptive beliefs, then modify unhelpful beliefs)  Pleasant Activity Scheduling (scheduling activities in the near future that you can look forward to, introduced more positivity and reduce negative thinking)  Recognizing the positive (Visualize, write, or discuss the best parts of the day to promote positive thinking patterns)    Psychoeducational Topic(s) Addressed:  Just the Facts - Substance Use   Just the Facts - Effective Communication  Treatment Planning  Problem Solving  Crisis  Intravascular ultrasound catheter inserted for high resolution imaging Intervention    Techniques utilized:   [place] announced at beginning of session  Review of goal  Review of previous meeting  Present new material  Problem-solving practice  Summarize progress made in current session  Identified urgent concerns  Assessed caregiver/family burden  Elicit client/family feedback    Assessment & progress:     The focus of today's session was check in, problem solving and educational materials.  Identified Jacqueline's symptoms since last visit as isolating / withdrawn, lack of energy, loss of interest / pleasure, low mood, negativistic / defiant, delusions, disorganized behaviors and/or thinking, impaired self control, anxiety, hallucinations and overwhelmed. They reported current psychosocial stressors are related to school and home conflicts. Family again reported concerns related to ongoing substance use. No safety concerns noted at the time of visit. Patient did not report any changes to medications.    The session started with agenda setting and a check-in. Check in was abbreviated due to family being late to session. The session proceeded with psychoeducation materials focused on substance use materials and effective communication. This writer presented educational materials in a conversational format to promote discussion, question/answer, and shared input. At first substance use was priority but after family identified concerns about ineffective communication related to ongoing substance use concerns, the effective communication module was started. This writer encouraged parents to practice effective communication tips over the next week and reflect on the experience of using strategies.    With regard to family dynamics, overall family seems as if they are able to interact in a cooperative, pleasant and calm manner.  Helpful clinical techniques utilized during today's appointment appeared to be psychoeducation, motivational interviewing, reflective listening, and providing validation.   As of today's appt insight into Gertrudes mental illness appears good.   Family would benefit from continued clinical intervention aimed at assisting them to implement helpful strategies at home and increase their understanding of psychosis.    Plan/Referrals:   Jacqueline and Jacqueline Montenegro's family are participating in coordinated speciality care via the Strengths Program within our clinic. Family did express interest in continuing to meet for family therapy and psychoeducation.    Will meet with family weekly for evidence based family psychoeducation and support aimed at maximizing Jacqueline's opportunity for recovery from psychosis.      Crisis Numbers:   Provided routinely in AVS     After hours:  [phone]    Next session: 3/29/23 at 6pm    Treatment plan last completed on: 1/4/23  Next treatment plan update due by: 90 days  Treatment plan was reviewed at this visit.  Acknowledged consent of current treatment plan was signed.    Reviewed goals to increase problem solving techniques, communication patterns, and learn about the patient's psychotic experiences with their family.  We discussed relevant progress made, and ways family can help with these goals.      Please EPIC message with any questions or concerns.    PROVIDER: Humberto Wells JORGE    Patient staffed in supervision with Priscilla Easton who will sign the note.  Supervisor is Priscilla Easton.

## 2025-06-30 DIAGNOSIS — F25.1 SCHIZOAFFECTIVE DISORDER, DEPRESSIVE TYPE (H): ICD-10-CM

## 2025-07-06 RX ORDER — ARIPIPRAZOLE 10 MG/1
10 TABLET ORAL EVERY MORNING
Qty: 90 TABLET | Refills: 1 | OUTPATIENT
Start: 2025-07-06

## 2025-07-06 NOTE — TELEPHONE ENCOUNTER
RF available: discuss 90 day at [facility]  ARIPiprazole (ABILIFY) 10 MG tablet   30 tablet 2 5/16/2025 -- No  Sig - Route: Take 1 tablet (10 mg) by mouth every morning. - Oral

## 2025-07-12 ENCOUNTER — HEALTH MAINTENANCE LETTER (OUTPATIENT)
Age: 20
End: 2025-07-12

## 2025-07-17 ENCOUNTER — PATIENT OUTREACH (OUTPATIENT)
Dept: CARE COORDINATION | Facility: CLINIC | Age: 20
End: 2025-07-17
Payer: COMMERCIAL

## 2025-07-17 ENCOUNTER — VIRTUAL VISIT (OUTPATIENT)
Dept: PSYCHIATRY | Facility: CLINIC | Age: 20
End: 2025-07-17
Attending: STUDENT IN AN ORGANIZED HEALTH CARE EDUCATION/TRAINING PROGRAM
Payer: COMMERCIAL

## 2025-07-17 DIAGNOSIS — T88.7XXA MEDICATION SIDE EFFECTS: ICD-10-CM

## 2025-07-17 DIAGNOSIS — F90.2 ADHD (ATTENTION DEFICIT HYPERACTIVITY DISORDER), COMBINED TYPE: ICD-10-CM

## 2025-07-17 DIAGNOSIS — F25.1 SCHIZOAFFECTIVE DISORDER, DEPRESSIVE TYPE (H): ICD-10-CM

## 2025-07-17 DIAGNOSIS — F12.90 CANNABIS USE DISORDER: ICD-10-CM

## 2025-07-17 PROCEDURE — 90792 PSYCH DIAG EVAL W/MED SRVCS: CPT | Mod: 95

## 2025-07-17 PROCEDURE — 1126F AMNT PAIN NOTED NONE PRSNT: CPT | Mod: 95

## 2025-07-17 RX ORDER — METFORMIN HYDROCHLORIDE 500 MG/1
TABLET, EXTENDED RELEASE ORAL
Qty: 90 TABLET | Refills: 2 | Status: SHIPPED | OUTPATIENT
Start: 2025-07-17

## 2025-07-17 RX ORDER — ESCITALOPRAM OXALATE 20 MG/1
20 TABLET ORAL EVERY MORNING
Qty: 30 TABLET | Refills: 2 | Status: SHIPPED | OUTPATIENT
Start: 2025-07-17

## 2025-07-17 RX ORDER — ARIPIPRAZOLE 10 MG/1
10 TABLET ORAL EVERY MORNING
Qty: 30 TABLET | Refills: 2 | Status: SHIPPED | OUTPATIENT
Start: 2025-07-17

## 2025-07-17 RX ORDER — ATOMOXETINE 40 MG/1
40 CAPSULE ORAL EVERY MORNING
Qty: 30 CAPSULE | Refills: 2 | Status: SHIPPED | OUTPATIENT
Start: 2025-07-17

## 2025-07-17 ASSESSMENT — PAIN SCALES - GENERAL: PAINLEVEL_OUTOF10: NO PAIN (0)

## 2025-07-17 NOTE — PROGRESS NOTES
Virtual Visit Details    Type of service:  Video Visit   Video Start Time: 3:20 PM  Video End Time:4:16 PM    Originating Location (pt. Location): Home    Distant Location (provider location):  On-site  Platform used for Video Visit: Sujit

## 2025-07-17 NOTE — PROGRESS NOTES
Clinic Care Coordination Contact  [facility]/Voicemail    Clinical Data: Care Coordinator Outreach    Outreach Documentation Number of Outreach Attempt   6/19/2025  10:10 AM 1   7/17/2025   9:41 AM 2       Left message on patient's voicemail with call back information and requested return call.      Plan: Care Coordinator will try to reach patient again in 1 month.    Kay Schneider [facility]  Clinic Care Coordination  [facility]  [email or web address]  [phone]

## 2025-07-17 NOTE — Clinical Note
I saw that her AIMS was due in December and hasn;t been done so try to get her to come in for an inperson visit. Due to her multpiple neurodevelpmental insults, smoking, past substance abuse all increase risk for TD so needs not only monitoring but teaching and accepting of that and other risks to consent too continuing treatment (or guardian needs this info).  Dustin SANCHEZ

## 2025-07-17 NOTE — NURSING NOTE
Current patient location: [facility] [address]  [facility] [address]    Is the patient currently in the state of [place]? YES    Visit mode: VIDEO    If the visit is dropped, the patient can be reconnected by:VIDEO VISIT: Text to cell phone:   Telephone Information:   Mobile [phone]       Will anyone else be joining the visit? NO  (If patient encounters technical issues they should call [phone])    Are changes needed to the allergy or medication list? No    Are refills needed on medications prescribed by this physician? NO    Rooming Documentation:  Questionnaire(s) completed    Reason for visit: RECHECK    John RUSSELLF

## 2025-07-17 NOTE — PATIENT INSTRUCTIONS
**For crisis resources, please see the information at the end of this document**   Patient Education    Thank you for coming to the [facility] [place] [facility].     Lab Testing:  If you had lab testing today and your results are reassuring or normal they will be mailed to you or sent through [employer] within 7 days. If the lab tests need quick action we will call you with the results. The phone number we will call with results is # [phone]. If this is not the best number please call our clinic and change the number.     Medication Refills:  If you need any refills please call your pharmacy and they will contact us. Our fax number for refills is [phone].   Three business days of notice are needed for general medication refill requests.   Five business days of notice are needed for controlled substance refill requests.   If you need to change to a different pharmacy, please contact the new pharmacy directly. The new pharmacy will help you get your medications transferred.     Contact Us:  Please call [phone] during business hours (8-5:00 M-F).   If you have medication related questions after clinic hours, or on the weekend, please call [phone].     Financial Assistance [phone]   Medical Records [phone]       MENTAL HEALTH CRISIS RESOURCES:  For a emergency help, please call 911 or go to the nearest Emergency Department.     Emergency Walk-In Options:   EmPATH Unit @ [facility] ([place]): [phone] - Specialized mental health emergency area designed to be calming  [facility] ([place]): [phone]  [facility] ([place]): [phone]  [facility]): [phone]    [facility] Crisis Information:   [place]: [phone]  [place]: [phone]  [place] ([place]) - Adult: [phone]     Child: [phone]  [place] - Adult: [phone]     Child: [phone]  [place]:  [phone]  List of all [facility] resources:   https://[place].gov/dhs/people-we-serve/adults/health-care/mental-health/resources/crisis-contacts.jsp    National Crisis Information:   Crisis Text Line: Text  [place]  to 359900  Suicide & Crisis Lifeline: 988  National Suicide Prevention Lifeline: 0-059-772-TALK (1-724.128.2934)       For online chat options, visit https://suicidepreventionlifeline.org/chat/  Poison Control Center: 1-108-331-8149  Trans Lifeline: 0-916-696-9671 - Hotline for transgender people of all ages  The [employer] Project: 1-899-625-5848 - Hotline for LGBT youth     For Non-Emergency Support:   Fast Tracker: Mental Health & Substance Use Disorder Resources -   https://[email or web address] [place].org/         None

## 2025-07-17 NOTE — PROGRESS NOTES
"   [facility] acute hospital Psychiatry Clinic  TRANSFER of CARE DIAGNOSTIC ASSESSMENT  [facility] Treatment Team       CARE TEAM:    PCP - Caroline Moreno  Therapist - DBT with Daniel Landauer  Mental health  (Aly Bell) - Leela Phillips is a 20 year old who uses the pronouns she, her, hers, they, them, theirs.                   Assessment & Plan     Schizoaffective disorder, depressive type   ADHD, unspecified type (by history)  Cannabis use disorder, current moderate-severe use  Alcohol use disorder severe, in sustained remission   Stimulant use disorder, in sustained remission   Other hallucinogen use disorder, moderate, in early remission   Tobacco use disorder, severe, in early remission    Generalized anxiety disorder (by history)  Auditory processing disorder (by history)  Dyscalculia (by history)  Other Specified Neurodevelopment, adverse life events and toxoplasmosis (by history)     Lauren JORDANBigg [place] \"Jacqueline\" is a 20 y.o. with supported past psychiatric/developmental diagnosis listed above, who is known in this clinic since January 2023, here today for a follow-up visit and transfer of care from Dr. Govea.     Today, Jacqueline reports ongoing stable mood since last visit. No safety concerns including SI; overall notes significant improvement in this since starting the program. Agreeable to staying with the current medication regimen as she has been adherent and finds them helpful. Jacqueline is in the preparation phase in regards [facility] treatment as she recognizes her cannabis use as a problem and has the desire to seek treatment. Right now, she and her dad cite scheduling difficulties as a barrier to set up an intake appointment at one of the recommended programs. Encouraged her to continue the discussion with her family, therapist, and psychiatry team and make the changes to pursue treatment, and she was agreeable to this plan.    Future " Considerations:  - Continue coordinating care with parents as needed   - Optimizing ADHD medication as needed (atomoxetine)  - Avoid stimulants due to potential for worsening psychosis   - Monitoring Abilify effectiveness as well as long-term use/metabolic side effects.  - Consider referral to weight management clinic if needed   - Once a period of sustained sobriety is achieved, consider obtaining neuropsychological testing to better understand her functioning as well as continue to support her with appropriate resources   - Continue offering/exploring resources for substance use/relapse prevention  https://www.[facility].org/substance-use-disorder   [email or web address]://[email or web address]/programs/[email or web address]       Psychotropic Drug Interactions:   ADDITIVE SEROTONERGIC: Abilify, escitalopram  ADDITIVE QTc: Escitalopram, atomoxetine   Management: routine monitoring    [place] was not checked today: not using controlled substances    Risk Statements:   Treatment Risk: Risks, benefits, alternatives and potential adverse effects have been discussed and are understood.   Safety Risk: [place] did not appear to be an imminent safety risk to self or others.    PLAN    1) Medications:   - Continue atomoxetine 40 mg daily  - Continue escitalopram 20 mg daily  - Continue aripiprazole 10 mg daily  - Continue NAC supplement 2400 mg daily  - Continue metformin  mg daily + 1000 mg nightly    2) Psychotherapy: DBT with Daniel Landauer weekly, family therapy with Renata Rivas    3) Next due:  Labs- Yearly AP labs-updated CBC, CMP, A1C and fasting lipids on 07/18/24  EKG- Routine monitoring is not indicated for current psychotropic medication regimen   Rating scales- AIMS:last score = 0. Next due December 2024. PHQ9, GAD7    4) Care Coordination / Referrals: None    5) Disposition: Return to clinic in 4 weeks                   Pertinent Background    Per 1/19/2023 note:   Jael Caba completed psychological testing and diagnosed  "Jacqueline with Other Specified Psychosis, Attenuated Psychosis Syndrome and Other Specified Depressive Disorder, Depressive Episode with Insufficient Symptoms (4/9/2021- 4/16/2021 and 4/30/2021).      Past history of dyslexia, auditory processing disorder, depression and prodromal symptoms. She was adopted as an infant and had developmental delays, no hx known about bio parents. 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     Per Dr. Alejo notes:  \"Notably, psychotic symptoms (paranoia, thought broadcasting, thought insertion, mind reading, AH, VH) preceded substance use; Prodromal symptoms seem to have been present since 4267-1426, and included hallucinations, social relational problems,depression and suicidal ideation.  Jacqueline reports first onset of psychiatric symptoms at age 15, and psychotic symptoms at age 15.  The above duration of untreated psychosis was approximately 2 years (until starting an antipsychotic.  While she carries a historical diagnosis of ADHD, this provider wonders if symptoms are best explained by schizoaffective disorder.  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 She is also endorsing eating concerns, so we will need to rule out an eating disorder.  While she is endorsing symptoms consistent with oppositional defiant disorder, this provider wonders if the symptoms are best understood in the context of substance use.\"      Pertinent items include: Psych pertinent item history includes suicide attempt , suicidal ideation, psychosis  and psych hosp , THC use, cocaine, ETHOH, hallucinogen use and tobacco use disorder.                    History of Present Illness     Patient presents today with her father. Since last visit,    Updates:  - Things have been \"alright, just life\" and baseline since May    Mood: Mood has been good. Endorses some " fatigue. No anhedonia, hopelessness, or excessive guilt. SI improved since starting the [facility]. No current SI, last had in February in the context of med non-adherence and poor sleep. No SIB, last had a few months ago. No HI. Reviewed safety plan, patient would contact friends/family, call [phone] or the clinic clinic, reach out to therapist.    Sleep: Goes to bed at 10 pm and wakes up 8-9 pm. Wakes up in the middle of the night to use the bathroom. No nightmares, but sometimes wakes up covered in sweat. Falls back asleep in 30 min. Dad mentions she stays up late with boyfriend. No decreased sleep need.    Appetite: Feels like she eats a lot of junk food. Eats two meals a day. No appetite changes. No restricting, purging, or binging. Used to make herself throw up a year ago, but not currently an issue.    Medications:  - atomoxetine 40 mg daily - adherent  - escitalopram 20 mg daily - adherent  - aripiprazole 10 mg daily - adherent  - NAC supplement 2400 mg daily - adherent, for cravings  - metformin  mg daily + 1000 mg nightly - adherent    ROS: No anxiety. No OCD symptoms. No cheryl symptoms. No psychosis including AVH (not sure when last had them), thought manipulation/broadcasting, IOR, or paranoia.    Substance use: Thinks daily cannabis use is an issue, has desire to quit, but waiting for treatment. 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 Dad wants her to prioritize treatment instead. Would prefer late morning/early afternoon, outpatient program. Her therapist has found her a list of programs and they need to sort out schedule and make the call for intake. [place] states she is 5/10 motivated. It is not a 2/10 because she wants to get better and not 8/10 because she likes smoking. Notes GI issues such as early satiety secondary to cannabis use. Does not struggle with nausea/vomiting.    Current Social History:  Financial/occupational: in school,  transition program during the school year for the past 2 years after high school. 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  Living situation: lives at home with parents  Social/spiritual support: family, friends, medical team      Pertinent Substance Use:  [Last updated 07/17/25]  Alcohol: Drinks once every 2-3 weeks. A few shot at a time, with friends.  Cannabis: Once every day, smoking.  Nicotine: Vapes daily.  Other: Last used meth/cocaine in 2 years ago.    Medical Review of Systems:   A comprehensive review of systems was performed and is negative other than noted above.                                                                                                                              Summary Points of Current Care  09/2024: mood continues to be stable, dicussed cannabis use with patient and parents and contraindication with hx of psychosis and mood   10/24: Due to daytime sedation, encouraged her to take shorter naps and regulate sleep cycle, also encouraged to abstain from cannabis  11/24: No medication changes, did request refill of OCP which we will bridge until establishes with new PCP  12/24: No medication changes, discussed strategies for improved adherence, will be getting Nexplanon  01/2025: No medication changes, recommended using [facility] and melatonin a few hours before intended bedtime to help with sleep hygiene  03/2025: no medication changes, consider future weight management referral  05/2025: shift medication scheduling to AM for Abilify, substance use treatment groups-resources provided   07/2025: No medication changes                  Physical Exam  (Vitals Only)    Pulse Readings from Last 3 Encounters:   02/19/25 100   02/13/25 81   01/29/25 100     Wt Readings from Last 3 Encounters:   02/19/25 74.9 kg (165 lb 1.6 oz) (90%, Z= 1.26)*   02/13/25 72.9 kg (160 lb 11.2 oz) (87%, Z= 1.15)*   01/29/25 75.3 kg (166 lb) (90%, Z= 1.28)*     * Growth percentiles are  "based on [facility] (Girls, 2-20 Years) data.     BP Readings from Last 3 Encounters:   02/19/25 105/71   02/13/25 117/73   01/29/25 113/77                      Mental Status Exam    Alertness: alert  and oriented  Appearance: casually groomed  Behavior/Demeanor: cooperative and calm, with fair  eye contact   Speech: normal and regular rate and rhythm  Language: intact and no problems, tends to be short with her answers  Psychomotor: normal or unremarkable  Mood: \"good\"  Affect: blunted; congruent to: mood- yes, content- yes  Thought Process/Associations: unremarkable  Thought Content:  Reports none;  Denies suicidal & violent ideation and delusions  Perception:  Reports none;  Denies hallucinations, IOR, paranoia, thought insertion/broadcasting  Insight: good  Judgment: good  Cognition: does  appear grossly intact; formal cognitive testing was not done  Gait and Station: N/A (telehealth)                  Past Psychiatric History     Self injurious behavior [method, most recent]: Yes: cutting, last time a few months ago  Suicide attempt [#, most recent, method]: Yes: 2023  Suicidal ideation hx [passive, active]: Yes: none currently     Violence/Aggression Hx: No   Psychosis Hx: Yes:    Eating Disorder Hx: Yes, not formally diagnosed  Trauma hx: Yes     Psych Hosp [#, most recent]: Yes: 2023  Commitment: No   TMS/ECT: No  Outpatient Programs [Day treatment, DBT, eating disorder tx, etc]: Yes: multiple, including DBT      Developmental History  See DA from Jael Caba July 2022      SUBSTANCE USE HISTORY   Past Use: Yes: alcohol, cannabis, cocaine, crack, methamphetamine, shrooms   Treatment [#, most recent]: Yes: dual diagnosis [facility]   Medical Consequences: No   Legal Consequences: No                   Family History     Adopted. No known family history.                  Past Psychotropic Medication Trials    Medication Max Dose (mg) Dates / Duration Helpful? DC Reason / Adverse Effects? "   sertraline           escitalopram 20         buspar           bupropion                       prazosin           hydroxyzine       Not needed/taken   adderall           Atomoxetine  25                     Abilify  10                         Past Medical History     Neurologic Hx [head injury, seizures, etc]:  toxoplasmosis infection at birth     Contraception : Nexplanon    Patient Active Problem List    Diagnosis Date Noted    Suicide ideation 11/15/2023     Priority: Medium    Schizoaffective disorder, bipolar type (H) 07/03/2023     Priority: Medium    ADHD (attention deficit hyperactivity disorder), combined type 07/03/2023     Priority: Medium    Anxiety 07/03/2023     Priority: Medium    Cannabis use disorder 07/03/2023     Priority: Medium    Suicidal ideation 06/24/2023     Priority: Medium                     Allergies     Patient has no known allergies.                Medications     Current Outpatient Medications   Medication Sig Dispense Refill    acetylcysteine (N-ACETYL CYSTEINE) 600 MG CAPS capsule Take 4 capsules (2,400 mg) by mouth every morning. 120 capsule 2    ARIPiprazole (ABILIFY) 10 MG tablet Take 1 tablet (10 mg) by mouth every morning. 30 tablet 2    atomoxetine (STRATTERA) 40 MG capsule Take 1 capsule (40 mg) by mouth every morning. 30 capsule 2    escitalopram (LEXAPRO) 20 MG tablet Take 1 tablet (20 mg) by mouth every morning. 30 tablet 2    etonogestrel (NEXPLANON) 68 MG IMPL 1 each (68 mg) by Subdermal route once.      fluticasone (FLONASE) 50 MCG/ACT nasal spray Spray 1 spray into both nostrils daily. 11.1 mL 0    ibuprofen (ADVIL/MOTRIN) 800 MG tablet Take 1 tablet (800 mg) by mouth every 8 hours as needed for moderate pain. 60 tablet 1    metFORMIN (GLUCOPHAGE XR) 500 MG 24 hr tablet Take 2 tablets (1,000 mg) by mouth daily AND 1 tablet (500 mg) daily (with dinner). For a total daily dose of 1500 mg. 90 tablet 2    naloxone (NARCAN) 4 MG/0.1ML nasal spray Spray 1 spray (4 mg) into  one nostril alternating nostrils as needed for opioid reversal. every 2-3 minutes until assistance arrives (Patient taking differently: Spray 4 mg into one nostril alternating nostrils as needed for opioid reversal. every 2-3 minutes until assistance arrives/ has not used yet) 2 each 3    norgestimate-ethinyl estradiol (ORTHO-CYCLEN) 0.25-35 MG-MCG tablet Take 1 tablet by mouth daily. 30 tablet 1    polyethylene glycol (MIRALAX) 17 GM/Dose powder Take 17 g (1 Capful) by mouth daily. 510 g 0    SUMAtriptan (IMITREX) 25 MG tablet Take 1 tablet (25 mg) by mouth at onset of headache for migraine. 1-2 tablets (25-50 mg) by mouth at onset of headache for migraine. May repeat in 2 hours. Max 8 tablets/24hours 9 tablet 1    triamcinolone (KENALOG) 0.025 % cream Apply topically daily as needed for irritation 80 g 0                     Data         9/17/2024     5:19 PM 1/9/2025     1:45 PM 4/18/2025    10:03 AM   PROMIS-10 Total Score w/o Sub Scores   PROMIS TOTAL - SUBSCORES 26 26  25        Patient-reported         6/18/2024     8:04 AM   CAGE-AID Total Score   Total Score 1   Total Score MyChart 1 (A total score of 2 or greater is considered clinically significant)        Proxy-reported         3/20/2025     3:26 PM 5/8/2025     2:23 PM 6/20/2025     4:00 PM   PHQ-9 SCORE   PHQ-9 Total Score MyChart 10 (Moderate depression) 15 (Moderately severe depression) Incomplete    PHQ-9 Total Score 10  15         Patient-reported    Proxy-reported         3/20/2025     3:27 PM 5/8/2025     2:25 PM 6/20/2025     4:02 PM   SHIVANI-7 SCORE   Total Score 13 (moderate anxiety) 16 (severe anxiety) 15 (severe anxiety)    Total Score 13  16  15        Patient-reported    Proxy-reported       Liver/Kidney Function, TSH Metabolic Blood counts   Recent Labs   Lab Test 07/18/24  1510 06/24/23  0717   AST 19 21   ALT 18 20   ALKPHOS 68 64   CR 0.75 0.57     Recent Labs   Lab Test 06/24/23  0717   TSH 1.44    Recent Labs   Lab Test 07/18/24  1510    CHOL 202*   TRIG 122*      HDL 71     Recent Labs   Lab Test 07/18/24  1510   A1C 5.0     Recent Labs   Lab Test 07/18/24  1510   GLC 81    Recent Labs   Lab Test 07/18/24  1510   WBC 9.1   HGB 12.5*   HCT 36.9   MCV 84             PROVIDER: Kailyn Simms MD    Patient staffed in clinic with Dr. Magaña who will sign the note.  Supervisor is Dr. Mari.

## 2025-08-08 ENCOUNTER — ALLIED HEALTH/NURSE VISIT (OUTPATIENT)
Dept: FAMILY MEDICINE | Facility: CLINIC | Age: 20
End: 2025-08-08
Payer: COMMERCIAL

## 2025-08-08 DIAGNOSIS — Z11.1 SCREENING EXAMINATION FOR PULMONARY TUBERCULOSIS: Primary | ICD-10-CM

## 2025-08-08 PROCEDURE — 99207 PR NO CHARGE NURSE ONLY: CPT

## 2025-08-14 ENCOUNTER — PATIENT OUTREACH (OUTPATIENT)
Dept: CARE COORDINATION | Facility: CLINIC | Age: 20
End: 2025-08-14
Payer: COMMERCIAL

## 2025-08-29 ENCOUNTER — VIRTUAL VISIT (OUTPATIENT)
Dept: PSYCHIATRY | Facility: CLINIC | Age: 20
End: 2025-08-29
Payer: COMMERCIAL

## 2025-08-29 DIAGNOSIS — F90.2 ADHD (ATTENTION DEFICIT HYPERACTIVITY DISORDER), COMBINED TYPE: ICD-10-CM

## 2025-08-29 DIAGNOSIS — F41.1 GENERALIZED ANXIETY DISORDER: ICD-10-CM

## 2025-08-29 DIAGNOSIS — F12.90 CANNABIS USE DISORDER: ICD-10-CM

## 2025-08-29 DIAGNOSIS — F17.200 TOBACCO USE DISORDER: ICD-10-CM

## 2025-08-29 DIAGNOSIS — F25.1 SCHIZOAFFECTIVE DISORDER, DEPRESSIVE TYPE (H): Primary | ICD-10-CM

## 2025-08-29 PROCEDURE — 90834 PSYTX W PT 45 MINUTES: CPT | Mod: 95 | Performed by: PSYCHOLOGIST

## 2025-09-03 ENCOUNTER — OFFICE VISIT (OUTPATIENT)
Dept: URGENT CARE | Facility: URGENT CARE | Age: 20
End: 2025-09-03
Payer: COMMERCIAL

## 2025-09-03 VITALS
BODY MASS INDEX: 30.61 KG/M2 | RESPIRATION RATE: 21 BRPM | OXYGEN SATURATION: 98 % | HEART RATE: 100 BPM | SYSTOLIC BLOOD PRESSURE: 101 MMHG | DIASTOLIC BLOOD PRESSURE: 65 MMHG | WEIGHT: 162 LBS | TEMPERATURE: 97.8 F

## 2025-09-03 DIAGNOSIS — J02.0 STREP THROAT: Primary | ICD-10-CM

## 2025-09-03 DIAGNOSIS — R07.0 THROAT PAIN: ICD-10-CM

## 2025-09-03 LAB — DEPRECATED S PYO AG THROAT QL EIA: POSITIVE

## 2025-09-03 PROCEDURE — 87880 STREP A ASSAY W/OPTIC: CPT | Performed by: NURSE PRACTITIONER

## 2025-09-03 PROCEDURE — 99213 OFFICE O/P EST LOW 20 MIN: CPT | Performed by: NURSE PRACTITIONER

## 2025-09-03 RX ORDER — PENICILLIN V POTASSIUM 500 MG/1
500 TABLET, FILM COATED ORAL 2 TIMES DAILY
Qty: 20 TABLET | Refills: 0 | Status: SHIPPED | OUTPATIENT
Start: 2025-09-03